# Patient Record
Sex: FEMALE | Race: WHITE | Employment: FULL TIME | URBAN - METROPOLITAN AREA
[De-identification: names, ages, dates, MRNs, and addresses within clinical notes are randomized per-mention and may not be internally consistent; named-entity substitution may affect disease eponyms.]

---

## 2017-02-20 RX ORDER — LISINOPRIL 20 MG/1
TABLET ORAL
Qty: 30 TABLET | Refills: 0 | Status: SHIPPED | OUTPATIENT
Start: 2017-02-20 | End: 2017-03-04

## 2017-02-20 NOTE — TELEPHONE ENCOUNTER
Spoke to patients spouse, left message with Jack Blackwood (on her hipaa) to call office back to set up BP check.

## 2017-03-04 ENCOUNTER — OFFICE VISIT (OUTPATIENT)
Dept: INTERNAL MEDICINE CLINIC | Facility: CLINIC | Age: 62
End: 2017-03-04

## 2017-03-04 VITALS
WEIGHT: 174 LBS | BODY MASS INDEX: 33 KG/M2 | DIASTOLIC BLOOD PRESSURE: 80 MMHG | TEMPERATURE: 98 F | HEART RATE: 56 BPM | SYSTOLIC BLOOD PRESSURE: 100 MMHG | RESPIRATION RATE: 16 BRPM

## 2017-03-04 DIAGNOSIS — I10 ESSENTIAL HYPERTENSION: Primary | ICD-10-CM

## 2017-03-04 DIAGNOSIS — R91.1 LUNG NODULE: ICD-10-CM

## 2017-03-04 DIAGNOSIS — K76.0 FATTY LIVER: ICD-10-CM

## 2017-03-04 DIAGNOSIS — E66.9 OBESITY (BMI 30-39.9): ICD-10-CM

## 2017-03-04 PROCEDURE — 99214 OFFICE O/P EST MOD 30 MIN: CPT | Performed by: INTERNAL MEDICINE

## 2017-03-04 RX ORDER — LISINOPRIL 20 MG/1
TABLET ORAL
Qty: 90 TABLET | Refills: 0 | Status: SHIPPED | OUTPATIENT
Start: 2017-03-04 | End: 2017-06-10

## 2017-03-04 RX ORDER — ATENOLOL 50 MG/1
50 TABLET ORAL DAILY
Qty: 90 TABLET | Refills: 0 | Status: SHIPPED | OUTPATIENT
Start: 2017-03-04 | End: 2017-06-10

## 2017-03-04 NOTE — PROGRESS NOTES
[facility]    CHIEF COMPLAINT:  Patient presents with:  Blood Pressure: check         HISTORY OF PRESENT ILLNESS:  Here for follow up. Htn: at goal. Taking meds regularly. No chest pain, no shortness of breath.      Has gained about 5lbs this wi TABLET (20 MG TOTAL) BY MOUTH DAILY. Dispense: 90 tablet; Refill: 0  - Lipid Panel [E]; Future  - Comp Metabolic Panel (14) [E]; Future    2. Obesity (BMI 30-39. 9)  Urged diet control  Urged to exercise regularly. 3. Lung nodule  Stable. Monitor.

## 2017-03-14 ENCOUNTER — APPOINTMENT (OUTPATIENT)
Dept: LAB | Age: 62
End: 2017-03-14
Attending: INTERNAL MEDICINE
Payer: COMMERCIAL

## 2017-03-14 DIAGNOSIS — I10 ESSENTIAL HYPERTENSION: ICD-10-CM

## 2017-03-14 LAB
ALBUMIN SERPL-MCNC: 3.6 G/DL (ref 3.5–4.8)
ALP LIVER SERPL-CCNC: 101 U/L (ref 50–130)
ALT SERPL-CCNC: 50 U/L (ref 14–54)
AST SERPL-CCNC: 29 U/L (ref 15–41)
BILIRUB SERPL-MCNC: 0.7 MG/DL (ref 0.1–2)
BUN BLD-MCNC: 21 MG/DL (ref 8–20)
CALCIUM BLD-MCNC: 9 MG/DL (ref 8.3–10.3)
CHLORIDE: 106 MMOL/L (ref 101–111)
CHOLEST SMN-MCNC: 142 MG/DL (ref ?–200)
CO2: 31 MMOL/L (ref 22–32)
CREAT BLD-MCNC: 0.91 MG/DL (ref 0.55–1.02)
GLUCOSE BLD-MCNC: 71 MG/DL (ref 70–99)
HDLC SERPL-MCNC: 38 MG/DL (ref 45–?)
HDLC SERPL: 3.74 {RATIO} (ref ?–4.44)
LDLC SERPL CALC-MCNC: 81 MG/DL (ref ?–130)
M PROTEIN MFR SERPL ELPH: 7.6 G/DL (ref 6.1–8.3)
NONHDLC SERPL-MCNC: 104 MG/DL (ref ?–130)
POTASSIUM SERPL-SCNC: 4.1 MMOL/L (ref 3.6–5.1)
SODIUM SERPL-SCNC: 143 MMOL/L (ref 136–144)
TRIGLYCERIDES: 113 MG/DL (ref ?–150)
VLDL: 23 MG/DL (ref 5–40)

## 2017-03-14 PROCEDURE — 80061 LIPID PANEL: CPT

## 2017-03-14 PROCEDURE — 80053 COMPREHEN METABOLIC PANEL: CPT

## 2017-05-05 ENCOUNTER — TELEPHONE (OUTPATIENT)
Dept: INTERNAL MEDICINE CLINIC | Facility: CLINIC | Age: 62
End: 2017-05-05

## 2017-05-05 DIAGNOSIS — R74.8 ELEVATED ALKALINE PHOSPHATASE LEVEL: ICD-10-CM

## 2017-05-05 DIAGNOSIS — E55.9 VITAMIN D DEFICIENCY: ICD-10-CM

## 2017-05-05 DIAGNOSIS — K76.0 FATTY LIVER: ICD-10-CM

## 2017-05-05 DIAGNOSIS — Z00.00 ROUTINE GENERAL MEDICAL EXAMINATION AT A HEALTH CARE FACILITY: Primary | ICD-10-CM

## 2017-05-05 DIAGNOSIS — I10 ESSENTIAL HYPERTENSION: ICD-10-CM

## 2017-05-05 NOTE — TELEPHONE ENCOUNTER
Pt asking for lab orders. Physical scheduled 10/14/17. Pt had Lipid and CMP 3/14/17. Pt is also scheduled for med check 6/10/17. Please advise.

## 2017-05-05 NOTE — TELEPHONE ENCOUNTER
Patient would like orders entered for labs associated with her annual physical.  She is scheduled to come in 10/14/17.

## 2017-05-08 NOTE — TELEPHONE ENCOUNTER
Please order cbc, cmp, lipid and tsh to do before her physical in October. No labs needed for Davina visit at this time. Will order if required at visit.

## 2017-05-09 NOTE — TELEPHONE ENCOUNTER
Labs ordered. History of Vit D deficiency. Do you want to re-check at this time? Detailed msg left on pt cell that labs ordered for October physical, fast 10-12 hrs, water okay.

## 2017-06-10 ENCOUNTER — OFFICE VISIT (OUTPATIENT)
Dept: INTERNAL MEDICINE CLINIC | Facility: CLINIC | Age: 62
End: 2017-06-10

## 2017-06-10 VITALS
HEART RATE: 53 BPM | RESPIRATION RATE: 12 BRPM | OXYGEN SATURATION: 99 % | SYSTOLIC BLOOD PRESSURE: 130 MMHG | TEMPERATURE: 98 F | DIASTOLIC BLOOD PRESSURE: 80 MMHG | HEIGHT: 61 IN | BODY MASS INDEX: 33.42 KG/M2 | WEIGHT: 177 LBS

## 2017-06-10 DIAGNOSIS — I10 ESSENTIAL HYPERTENSION: Primary | ICD-10-CM

## 2017-06-10 DIAGNOSIS — R63.5 WEIGHT GAIN: ICD-10-CM

## 2017-06-10 PROCEDURE — 99213 OFFICE O/P EST LOW 20 MIN: CPT | Performed by: INTERNAL MEDICINE

## 2017-06-10 RX ORDER — ATENOLOL 50 MG/1
50 TABLET ORAL DAILY
Qty: 90 TABLET | Refills: 1 | Status: SHIPPED | OUTPATIENT
Start: 2017-06-10 | End: 2017-10-14

## 2017-06-10 RX ORDER — LISINOPRIL 20 MG/1
TABLET ORAL
Qty: 90 TABLET | Refills: 1 | Status: SHIPPED | OUTPATIENT
Start: 2017-06-10 | End: 2017-10-14

## 2017-06-10 NOTE — PROGRESS NOTES
[facility]    CHIEF COMPLAINT:  Patient presents with:  Blood Pressure        HISTORY OF PRESENT ILLNESS:  Here for follow up. Htn: at goal. Taking meds regularly. No chest pain, no shortness of breath.      Weight gain: has gained about 3 lbs s PLAN:  1. Essential hypertension  At goal  - atenolol 50 MG Oral Tab; Take 1 tablet (50 mg total) by mouth daily. Dispense: 90 tablet; Refill: 1  - lisinopril 20 MG Oral Tab; TAKE 1 TABLET (20 MG TOTAL) BY MOUTH DAILY. Dispense: 90 tablet;  Refill: 1    2

## 2017-07-12 ENCOUNTER — OFFICE VISIT (OUTPATIENT)
Dept: INTERNAL MEDICINE CLINIC | Facility: CLINIC | Age: 62
End: 2017-07-12

## 2017-07-12 VITALS
HEART RATE: 51 BPM | HEIGHT: 61 IN | RESPIRATION RATE: 16 BRPM | BODY MASS INDEX: 33.64 KG/M2 | TEMPERATURE: 98 F | OXYGEN SATURATION: 95 % | SYSTOLIC BLOOD PRESSURE: 138 MMHG | WEIGHT: 178.19 LBS | DIASTOLIC BLOOD PRESSURE: 78 MMHG

## 2017-07-12 DIAGNOSIS — R03.0 TRANSIENT ELEVATED BLOOD PRESSURE: ICD-10-CM

## 2017-07-12 DIAGNOSIS — R51.9 NONINTRACTABLE HEADACHE, UNSPECIFIED CHRONICITY PATTERN, UNSPECIFIED HEADACHE TYPE: ICD-10-CM

## 2017-07-12 DIAGNOSIS — R53.83 FATIGUE, UNSPECIFIED TYPE: Primary | ICD-10-CM

## 2017-07-12 DIAGNOSIS — R63.0 DECREASED APPETITE: ICD-10-CM

## 2017-07-12 PROCEDURE — 99214 OFFICE O/P EST MOD 30 MIN: CPT | Performed by: PHYSICIAN ASSISTANT

## 2017-07-12 NOTE — PROGRESS NOTES
Tye Leon is a 57year old female  Patient presents with:  Blood Pressure  Headache      HPI:   Pt here to discuss htn  - she has noticed that about 1-2 weeks ago her blood pressure creeping up; around 160s/100  - she has been waking up in the mid shortness of breath with exertion,or cough   CARDIOVASCULAR: denies chest pain on exertion  GI: denies abdominal pain and denies heartburn, or change in bms  NEURO: as noted above    EXAM:   /78   Pulse 51   Temp 98.2 °F (36.8 °C) (Oral)   Resp 16

## 2017-07-13 ENCOUNTER — LABORATORY ENCOUNTER (OUTPATIENT)
Dept: LAB | Age: 62
End: 2017-07-13
Attending: INTERNAL MEDICINE
Payer: COMMERCIAL

## 2017-07-13 DIAGNOSIS — E55.9 VITAMIN D DEFICIENCY: ICD-10-CM

## 2017-07-13 DIAGNOSIS — I10 ESSENTIAL HYPERTENSION: ICD-10-CM

## 2017-07-13 DIAGNOSIS — K76.0 FATTY LIVER: ICD-10-CM

## 2017-07-13 DIAGNOSIS — Z00.00 ROUTINE GENERAL MEDICAL EXAMINATION AT A HEALTH CARE FACILITY: ICD-10-CM

## 2017-07-13 DIAGNOSIS — R74.8 ELEVATED ALKALINE PHOSPHATASE LEVEL: ICD-10-CM

## 2017-07-13 LAB
25-HYDROXYVITAMIN D (TOTAL): 19.5 NG/ML (ref 30–100)
ALBUMIN SERPL-MCNC: 3.9 G/DL (ref 3.5–4.8)
ALP LIVER SERPL-CCNC: 101 U/L (ref 50–130)
ALT SERPL-CCNC: 64 U/L (ref 14–54)
AST SERPL-CCNC: 40 U/L (ref 15–41)
BASOPHILS # BLD AUTO: 0.02 X10(3) UL (ref 0–0.1)
BASOPHILS NFR BLD AUTO: 0.5 %
BILIRUB SERPL-MCNC: 1 MG/DL (ref 0.1–2)
BUN BLD-MCNC: 17 MG/DL (ref 8–20)
CALCIUM BLD-MCNC: 8.8 MG/DL (ref 8.3–10.3)
CHLORIDE: 105 MMOL/L (ref 101–111)
CHOLEST SMN-MCNC: 142 MG/DL (ref ?–200)
CO2: 27 MMOL/L (ref 22–32)
CREAT BLD-MCNC: 0.89 MG/DL (ref 0.55–1.02)
EOSINOPHIL # BLD AUTO: 0.08 X10(3) UL (ref 0–0.3)
EOSINOPHIL NFR BLD AUTO: 2.1 %
ERYTHROCYTE [DISTWIDTH] IN BLOOD BY AUTOMATED COUNT: 12.3 % (ref 11.5–16)
GLUCOSE BLD-MCNC: 81 MG/DL (ref 70–99)
HCT VFR BLD AUTO: 45.9 % (ref 34–50)
HDLC SERPL-MCNC: 42 MG/DL (ref 45–?)
HDLC SERPL: 3.38 {RATIO} (ref ?–4.44)
HGB BLD-MCNC: 14.7 G/DL (ref 12–16)
IMMATURE GRANULOCYTE COUNT: 0.01 X10(3) UL (ref 0–1)
IMMATURE GRANULOCYTE RATIO %: 0.3 %
LDLC SERPL CALC-MCNC: 78 MG/DL (ref ?–130)
LYMPHOCYTES # BLD AUTO: 0.92 X10(3) UL (ref 0.9–4)
LYMPHOCYTES NFR BLD AUTO: 23.6 %
M PROTEIN MFR SERPL ELPH: 7.6 G/DL (ref 6.1–8.3)
MCH RBC QN AUTO: 31.1 PG (ref 27–33.2)
MCHC RBC AUTO-ENTMCNC: 32 G/DL (ref 31–37)
MCV RBC AUTO: 97 FL (ref 81–100)
MONOCYTES # BLD AUTO: 0.44 X10(3) UL (ref 0.1–0.6)
MONOCYTES NFR BLD AUTO: 11.3 %
NEUTROPHIL ABS PRELIM: 2.43 X10 (3) UL (ref 1.3–6.7)
NEUTROPHILS # BLD AUTO: 2.43 X10(3) UL (ref 1.3–6.7)
NEUTROPHILS NFR BLD AUTO: 62.2 %
NONHDLC SERPL-MCNC: 100 MG/DL (ref ?–130)
PLATELET # BLD AUTO: 161 10(3)UL (ref 150–450)
POTASSIUM SERPL-SCNC: 3.9 MMOL/L (ref 3.6–5.1)
RBC # BLD AUTO: 4.73 X10(6)UL (ref 3.8–5.1)
RED CELL DISTRIBUTION WIDTH-SD: 43.2 FL (ref 35.1–46.3)
SODIUM SERPL-SCNC: 141 MMOL/L (ref 136–144)
TRIGLYCERIDES: 108 MG/DL (ref ?–150)
TSI SER-ACNC: 1.75 MIU/ML (ref 0.35–5.5)
VLDL: 22 MG/DL (ref 5–40)
WBC # BLD AUTO: 3.9 X10(3) UL (ref 4–13)

## 2017-07-13 PROCEDURE — 36415 COLL VENOUS BLD VENIPUNCTURE: CPT

## 2017-07-13 PROCEDURE — 85025 COMPLETE CBC W/AUTO DIFF WBC: CPT

## 2017-07-13 PROCEDURE — 84443 ASSAY THYROID STIM HORMONE: CPT

## 2017-07-13 PROCEDURE — 80053 COMPREHEN METABOLIC PANEL: CPT

## 2017-07-13 PROCEDURE — 80061 LIPID PANEL: CPT

## 2017-07-13 PROCEDURE — 82306 VITAMIN D 25 HYDROXY: CPT

## 2017-07-14 DIAGNOSIS — R74.8 ELEVATED LIVER ENZYMES: Primary | ICD-10-CM

## 2017-07-14 DIAGNOSIS — I10 ESSENTIAL HYPERTENSION: ICD-10-CM

## 2017-07-14 DIAGNOSIS — R79.89 LOW VITAMIN D LEVEL: Primary | ICD-10-CM

## 2017-07-14 RX ORDER — ERGOCALCIFEROL 1.25 MG/1
50000 CAPSULE ORAL WEEKLY
Qty: 12 CAPSULE | Refills: 0 | Status: SHIPPED | OUTPATIENT
Start: 2017-07-14 | End: 2017-10-06

## 2017-09-08 RX ORDER — METOPROLOL SUCCINATE 50 MG/1
50 TABLET, EXTENDED RELEASE ORAL DAILY
Qty: 90 TABLET | Refills: 0 | Status: SHIPPED | OUTPATIENT
Start: 2017-09-08 | End: 2017-10-14

## 2017-09-08 NOTE — TELEPHONE ENCOUNTER
Refill done for metoprolol. She was originally on atenolol but this is back ordered. If elevated bp or if any lightheadedness or dizziness pt should call us. Please inform pt about this change.

## 2017-09-08 NOTE — TELEPHONE ENCOUNTER
Patient was previously on Atenolol 50 mg     Last OV pertinent to medication: 7/12/17 acute      6/10/17 BP check   Last refill date: 6/10/17     #/refills: 90 tabs + 1 refill   When pt was asked to return for OV: October for physical   Upcoming appt/reaso

## 2017-09-29 ENCOUNTER — PATIENT MESSAGE (OUTPATIENT)
Dept: INTERNAL MEDICINE CLINIC | Facility: CLINIC | Age: 62
End: 2017-09-29

## 2017-09-29 NOTE — TELEPHONE ENCOUNTER
From: Janice Waters Tommy  To: Kira Cifuentes MD  Sent: 9/29/2017 6:34 AM CDT  Subject: Non-Urgent Medical Question    Having a physical on 10/14, already had some blood work done-Do I need to go in for more? Not sure if there is an order on file.  Blood pres

## 2017-09-29 NOTE — TELEPHONE ENCOUNTER
Pt has PE on 10/14/17. Overdue for CMP lab (was due approx 8/14/17), Vitamin D is due to recheck at time of PE (approx 10/14/17).  Since pt has labs anyway, asking if any addl should be ordered for appt - or would you prefer for her to just wait on all labs

## 2017-09-29 NOTE — TELEPHONE ENCOUNTER
See lab orders. There is an order in for cmp and vit d to be done. She should do these prior to her visit.

## 2017-10-03 ENCOUNTER — APPOINTMENT (OUTPATIENT)
Dept: LAB | Age: 62
End: 2017-10-03
Attending: PHYSICIAN ASSISTANT
Payer: COMMERCIAL

## 2017-10-03 DIAGNOSIS — R74.8 ELEVATED LIVER ENZYMES: ICD-10-CM

## 2017-10-03 DIAGNOSIS — R79.89 LOW VITAMIN D LEVEL: ICD-10-CM

## 2017-10-03 DIAGNOSIS — I10 ESSENTIAL HYPERTENSION: ICD-10-CM

## 2017-10-03 PROCEDURE — 80053 COMPREHEN METABOLIC PANEL: CPT

## 2017-10-03 PROCEDURE — 36415 COLL VENOUS BLD VENIPUNCTURE: CPT

## 2017-10-03 PROCEDURE — 82306 VITAMIN D 25 HYDROXY: CPT

## 2017-10-14 ENCOUNTER — OFFICE VISIT (OUTPATIENT)
Dept: INTERNAL MEDICINE CLINIC | Facility: CLINIC | Age: 62
End: 2017-10-14

## 2017-10-14 VITALS
TEMPERATURE: 98 F | OXYGEN SATURATION: 98 % | WEIGHT: 175 LBS | HEART RATE: 49 BPM | HEIGHT: 61.5 IN | SYSTOLIC BLOOD PRESSURE: 136 MMHG | BODY MASS INDEX: 32.62 KG/M2 | RESPIRATION RATE: 16 BRPM | DIASTOLIC BLOOD PRESSURE: 86 MMHG

## 2017-10-14 DIAGNOSIS — K76.0 FATTY LIVER: ICD-10-CM

## 2017-10-14 DIAGNOSIS — I10 ESSENTIAL HYPERTENSION: ICD-10-CM

## 2017-10-14 DIAGNOSIS — Z12.31 ENCOUNTER FOR SCREENING MAMMOGRAM FOR BREAST CANCER: ICD-10-CM

## 2017-10-14 DIAGNOSIS — G89.29 CHRONIC PAIN OF LEFT KNEE: ICD-10-CM

## 2017-10-14 DIAGNOSIS — M25.562 CHRONIC PAIN OF LEFT KNEE: ICD-10-CM

## 2017-10-14 DIAGNOSIS — Z12.11 SCREENING FOR COLON CANCER: ICD-10-CM

## 2017-10-14 DIAGNOSIS — Z00.00 PHYSICAL EXAM, ANNUAL: Primary | ICD-10-CM

## 2017-10-14 PROCEDURE — 99396 PREV VISIT EST AGE 40-64: CPT | Performed by: INTERNAL MEDICINE

## 2017-10-14 RX ORDER — METOPROLOL SUCCINATE 50 MG/1
50 TABLET, EXTENDED RELEASE ORAL DAILY
Qty: 90 TABLET | Refills: 0 | Status: SHIPPED | OUTPATIENT
Start: 2017-10-14 | End: 2018-01-15

## 2017-10-14 RX ORDER — LISINOPRIL 20 MG/1
TABLET ORAL
Qty: 90 TABLET | Refills: 0 | Status: SHIPPED | OUTPATIENT
Start: 2017-10-14 | End: 2018-01-15

## 2017-10-14 NOTE — PROGRESS NOTES
[facility]    CHIEF COMPLAINT: Patient presents with:  Physical: pap test due. Brought BP readings. Immunization record updated.         HPI:   Yinka Lord is a 60year old female who presents for a complete physical exam. Symptoms: denies with cholelithiasis 3/27/2014   • Dysuria    • Elevated alkaline phosphatase level 12/9/2013   • HTN (hypertension)    • Lung nodule 12/9/2013   • Obesity    • Strabismus    • Unspecified essential hypertension 6/29/2012   • Vitamin D deficiency 12/9/2013 chest tenderness  LUNGS: clear to auscultation  CARDIO: nl s1 and s2, RRR without murmur  GI: good BS's,no masses, HSM or tenderness  BREAST: no dominant or suspicious mass, no nipple discharge  GENITAL/URINARY:  External Genitalia:  General appearance; no a complete physical exam.   1. Physical exam, annual  Pelvic done. mammo ordered as screening, she still has order for additional views in the system. She will check with mammo dept if she needs anything else   Colonoscopy never done. Declines.  Given hem

## 2017-10-22 ENCOUNTER — LAB ENCOUNTER (OUTPATIENT)
Dept: LAB | Facility: HOSPITAL | Age: 62
End: 2017-10-22
Attending: INTERNAL MEDICINE
Payer: COMMERCIAL

## 2017-10-22 DIAGNOSIS — Z12.11 SCREENING FOR COLON CANCER: Primary | ICD-10-CM

## 2017-10-22 PROCEDURE — 82272 OCCULT BLD FECES 1-3 TESTS: CPT

## 2017-12-28 ENCOUNTER — HOSPITAL ENCOUNTER (OUTPATIENT)
Age: 62
Discharge: HOME | End: 2017-12-28
Attending: FAMILY MEDICINE
Payer: COMMERCIAL

## 2017-12-28 VITALS
HEART RATE: 74 BPM | WEIGHT: 175.06 LBS | OXYGEN SATURATION: 98 % | TEMPERATURE: 98 F | DIASTOLIC BLOOD PRESSURE: 100 MMHG | RESPIRATION RATE: 16 BRPM | BODY MASS INDEX: 33 KG/M2 | SYSTOLIC BLOOD PRESSURE: 160 MMHG

## 2017-12-28 DIAGNOSIS — J04.0 ACUTE LARYNGITIS: Primary | ICD-10-CM

## 2017-12-28 PROCEDURE — 87081 CULTURE SCREEN ONLY: CPT | Performed by: FAMILY MEDICINE

## 2017-12-28 PROCEDURE — 99204 OFFICE O/P NEW MOD 45 MIN: CPT

## 2017-12-28 PROCEDURE — 99214 OFFICE O/P EST MOD 30 MIN: CPT

## 2017-12-28 PROCEDURE — 87430 STREP A AG IA: CPT | Performed by: FAMILY MEDICINE

## 2017-12-28 RX ORDER — FLUTICASONE PROPIONATE 50 MCG
2 SPRAY, SUSPENSION (ML) NASAL DAILY
Qty: 1 BOTTLE | Refills: 1 | Status: SHIPPED | OUTPATIENT
Start: 2017-12-28 | End: 2018-01-15

## 2017-12-28 RX ORDER — BENZONATATE 200 MG/1
200 CAPSULE ORAL EVERY 8 HOURS PRN
Qty: 30 CAPSULE | Refills: 0 | Status: SHIPPED | OUTPATIENT
Start: 2017-12-28 | End: 2018-01-15

## 2017-12-29 NOTE — ED PROVIDER NOTES
Patient Seen in: [address] [facility]    History   Patient presents with:  Sore Throat    Stated Complaint: sore throat, cough, x1 week    Nasal congestion x 1 week.   Was getting increased sinus pressure, but its improving now.  + fa appears well-developed and well-nourished. No distress. HENT:   Right Ear: External ear normal.   Left Ear: External ear normal.   Mouth/Throat: Oropharynx is clear and moist. No oropharyngeal exudate.    Eyes: Conjunctivae are normal. Right eye exhibits laryngitis

## 2018-01-15 ENCOUNTER — OFFICE VISIT (OUTPATIENT)
Dept: INTERNAL MEDICINE CLINIC | Facility: CLINIC | Age: 63
End: 2018-01-15

## 2018-01-15 VITALS
DIASTOLIC BLOOD PRESSURE: 96 MMHG | HEART RATE: 64 BPM | RESPIRATION RATE: 12 BRPM | TEMPERATURE: 99 F | HEIGHT: 61.5 IN | BODY MASS INDEX: 32.68 KG/M2 | SYSTOLIC BLOOD PRESSURE: 160 MMHG | WEIGHT: 175.31 LBS

## 2018-01-15 DIAGNOSIS — K76.0 FATTY LIVER: ICD-10-CM

## 2018-01-15 DIAGNOSIS — I77.9 BILATERAL CAROTID ARTERY DISEASE (HCC): ICD-10-CM

## 2018-01-15 DIAGNOSIS — I10 ESSENTIAL HYPERTENSION: ICD-10-CM

## 2018-01-15 PROCEDURE — 99214 OFFICE O/P EST MOD 30 MIN: CPT | Performed by: INTERNAL MEDICINE

## 2018-01-15 RX ORDER — METOPROLOL SUCCINATE 50 MG/1
50 TABLET, EXTENDED RELEASE ORAL DAILY
Qty: 90 TABLET | Refills: 0 | Status: SHIPPED | OUTPATIENT
Start: 2018-01-15 | End: 2018-02-05

## 2018-01-15 RX ORDER — LISINOPRIL 40 MG/1
40 TABLET ORAL DAILY
Qty: 90 TABLET | Refills: 0 | Status: SHIPPED | OUTPATIENT
Start: 2018-01-15 | End: 2018-02-05

## 2018-01-15 NOTE — PROGRESS NOTES
[address] [facility]    CHIEF COMPLAINT:  Patient presents with:  Blood Pressure: has not had a colonoscopy but has done stool cards. HISTORY OF PRESENT ILLNESS:  Here for follow up. Htn: elevated today. Has been elevated at home as well.  Takes h lisinopril 40 MG Oral Tab; Take 1 tablet (40 mg total) by mouth daily. TAKE 1 TABLET (20 MG TOTAL) BY MOUTH DAILY. Dispense: 90 tablet; Refill: 0  - Metoprolol Succinate ER 50 MG Oral Tablet 24 Hr; Take 1 tablet (50 mg total) by mouth daily.   Dispense: 90

## 2018-01-30 ENCOUNTER — APPOINTMENT (OUTPATIENT)
Dept: LAB | Age: 63
End: 2018-01-30
Attending: INTERNAL MEDICINE
Payer: COMMERCIAL

## 2018-01-30 DIAGNOSIS — I10 ESSENTIAL HYPERTENSION: ICD-10-CM

## 2018-01-30 LAB
ALBUMIN SERPL-MCNC: 3.9 G/DL (ref 3.5–4.8)
ALP LIVER SERPL-CCNC: 105 U/L (ref 50–130)
ALT SERPL-CCNC: 96 U/L (ref 14–54)
AST SERPL-CCNC: 63 U/L (ref 15–41)
BILIRUB SERPL-MCNC: 0.8 MG/DL (ref 0.1–2)
BUN BLD-MCNC: 18 MG/DL (ref 8–20)
CALCIUM BLD-MCNC: 8.8 MG/DL (ref 8.3–10.3)
CHLORIDE: 106 MMOL/L (ref 101–111)
CO2: 30 MMOL/L (ref 22–32)
CREAT BLD-MCNC: 0.77 MG/DL (ref 0.55–1.02)
GLUCOSE BLD-MCNC: 86 MG/DL (ref 70–99)
M PROTEIN MFR SERPL ELPH: 7.7 G/DL (ref 6.1–8.3)
POTASSIUM SERPL-SCNC: 4.3 MMOL/L (ref 3.6–5.1)
SODIUM SERPL-SCNC: 142 MMOL/L (ref 136–144)

## 2018-01-30 PROCEDURE — 36415 COLL VENOUS BLD VENIPUNCTURE: CPT

## 2018-01-30 PROCEDURE — 80053 COMPREHEN METABOLIC PANEL: CPT

## 2018-01-31 DIAGNOSIS — R79.89 ELEVATED LFTS: Primary | ICD-10-CM

## 2018-02-03 ENCOUNTER — HOSPITAL ENCOUNTER (OUTPATIENT)
Dept: ULTRASOUND IMAGING | Age: 63
Discharge: HOME | End: 2018-02-03
Attending: INTERNAL MEDICINE
Payer: COMMERCIAL

## 2018-02-03 DIAGNOSIS — R79.89 ELEVATED LFTS: ICD-10-CM

## 2018-02-03 PROCEDURE — 76700 US EXAM ABDOM COMPLETE: CPT | Performed by: INTERNAL MEDICINE

## 2018-02-05 ENCOUNTER — OFFICE VISIT (OUTPATIENT)
Dept: INTERNAL MEDICINE CLINIC | Facility: CLINIC | Age: 63
End: 2018-02-05

## 2018-02-05 VITALS
DIASTOLIC BLOOD PRESSURE: 96 MMHG | SYSTOLIC BLOOD PRESSURE: 164 MMHG | WEIGHT: 173.5 LBS | TEMPERATURE: 99 F | HEIGHT: 61.5 IN | BODY MASS INDEX: 32.34 KG/M2 | RESPIRATION RATE: 12 BRPM | HEART RATE: 76 BPM

## 2018-02-05 DIAGNOSIS — K76.0 FATTY LIVER: ICD-10-CM

## 2018-02-05 DIAGNOSIS — I10 ESSENTIAL HYPERTENSION: ICD-10-CM

## 2018-02-05 DIAGNOSIS — J06.9 UPPER RESPIRATORY TRACT INFECTION, UNSPECIFIED TYPE: ICD-10-CM

## 2018-02-05 PROCEDURE — 99214 OFFICE O/P EST MOD 30 MIN: CPT | Performed by: INTERNAL MEDICINE

## 2018-02-05 RX ORDER — LISINOPRIL 40 MG/1
40 TABLET ORAL DAILY
Qty: 90 TABLET | Refills: 0 | COMMUNITY
Start: 2018-02-05 | End: 2018-04-16

## 2018-02-05 RX ORDER — METOPROLOL SUCCINATE 50 MG/1
100 TABLET, EXTENDED RELEASE ORAL DAILY
Qty: 90 TABLET | Refills: 0 | COMMUNITY
Start: 2018-02-05 | End: 2018-02-20

## 2018-02-05 RX ORDER — AZITHROMYCIN 250 MG/1
TABLET, FILM COATED ORAL
Qty: 6 TABLET | Refills: 0 | Status: SHIPPED | OUTPATIENT
Start: 2018-02-05 | End: 2018-02-20 | Stop reason: ALTCHOICE

## 2018-02-05 RX ORDER — BENZONATATE 200 MG/1
200 CAPSULE ORAL 3 TIMES DAILY PRN
Qty: 30 CAPSULE | Refills: 0 | Status: SHIPPED | OUTPATIENT
Start: 2018-02-05 | End: 2018-02-20 | Stop reason: ALTCHOICE

## 2018-02-05 NOTE — PROGRESS NOTES
[facility]    CHIEF COMPLAINT:  Patient presents with:  Blood Pressure: has not had colonoscopy. Did stool cards last year  Sinus Problem: sx returned yesterday.   Cough: took a Tessalon Peale last pm        HISTORY OF PRESENT ILLNESS:  Here for mg/dL   BUN 18 8 - 20 mg/dL   Creatinine 0.77 0.55 - 1.02 mg/dL   GFR 82 >=60   Calcium, Total 8.8 8.3 - 10.3 mg/dL   Alkaline Phosphatase 105 50 - 130 U/L   AST 63 (H) 15 - 41 U/L   Alt 96 (H) 14 - 54 U/L   Bilirubin, Total 0.8 0.1 - 2.0 mg/dL   Total Pro

## 2018-02-20 ENCOUNTER — OFFICE VISIT (OUTPATIENT)
Dept: INTERNAL MEDICINE CLINIC | Facility: CLINIC | Age: 63
End: 2018-02-20

## 2018-02-20 VITALS
SYSTOLIC BLOOD PRESSURE: 110 MMHG | HEIGHT: 61.5 IN | DIASTOLIC BLOOD PRESSURE: 70 MMHG | BODY MASS INDEX: 32.43 KG/M2 | TEMPERATURE: 98 F | WEIGHT: 174 LBS | HEART RATE: 68 BPM

## 2018-02-20 DIAGNOSIS — R74.8 ELEVATED LIVER ENZYMES: ICD-10-CM

## 2018-02-20 DIAGNOSIS — I10 ESSENTIAL HYPERTENSION: ICD-10-CM

## 2018-02-20 PROCEDURE — 99213 OFFICE O/P EST LOW 20 MIN: CPT | Performed by: INTERNAL MEDICINE

## 2018-02-20 RX ORDER — METOPROLOL SUCCINATE 100 MG/1
100 TABLET, EXTENDED RELEASE ORAL DAILY
Qty: 90 TABLET | Refills: 0 | Status: SHIPPED | OUTPATIENT
Start: 2018-02-20 | End: 2018-04-16

## 2018-02-20 NOTE — PROGRESS NOTES
[facility]    CHIEF COMPLAINT:  Patient presents with:  Blood Pressure        HISTORY OF PRESENT ILLNESS:  Here for follow up. Htn: at goal now. Taking metoprolol succinate 100mg daily and lisinopril 40mg daily. Tolerating well.  No chest pain, Metoprolol Succinate  MG Oral Tablet 24 Hr; Take 1 tablet (100 mg total) by mouth daily. Dispense: 90 tablet; Refill: 0    2. Elevated liver enzymes  Recheck in 3 months. Low fat low carb diet. Weight loss. - COMP METABOLIC PANEL (14);  Future

## 2018-04-13 DIAGNOSIS — I10 ESSENTIAL HYPERTENSION: ICD-10-CM

## 2018-04-14 ENCOUNTER — HOSPITAL ENCOUNTER (OUTPATIENT)
Dept: MAMMOGRAPHY | Facility: HOSPITAL | Age: 63
Discharge: HOME | End: 2018-04-14
Attending: INTERNAL MEDICINE
Payer: COMMERCIAL

## 2018-04-14 DIAGNOSIS — Z12.31 ENCOUNTER FOR SCREENING MAMMOGRAM FOR BREAST CANCER: ICD-10-CM

## 2018-04-14 PROCEDURE — 77067 SCR MAMMO BI INCL CAD: CPT | Performed by: INTERNAL MEDICINE

## 2018-04-14 PROCEDURE — 77063 BREAST TOMOSYNTHESIS BI: CPT | Performed by: INTERNAL MEDICINE

## 2018-04-16 DIAGNOSIS — I10 ESSENTIAL HYPERTENSION: ICD-10-CM

## 2018-04-16 RX ORDER — LISINOPRIL 40 MG/1
TABLET ORAL
Qty: 90 TABLET | Refills: 0 | Status: SHIPPED | OUTPATIENT
Start: 2018-04-16 | End: 2018-07-10

## 2018-04-16 RX ORDER — METOPROLOL SUCCINATE 100 MG/1
100 TABLET, EXTENDED RELEASE ORAL DAILY
Qty: 90 TABLET | Refills: 0 | Status: SHIPPED | OUTPATIENT
Start: 2018-04-16 | End: 2018-09-01

## 2018-04-16 RX ORDER — METOPROLOL SUCCINATE 100 MG/1
TABLET, EXTENDED RELEASE ORAL
Qty: 90 TABLET | Refills: 0 | OUTPATIENT
Start: 2018-04-16

## 2018-04-16 RX ORDER — METOPROLOL SUCCINATE 50 MG/1
TABLET, EXTENDED RELEASE ORAL
Qty: 90 TABLET | Refills: 0 | OUTPATIENT
Start: 2018-04-16

## 2018-04-16 NOTE — TELEPHONE ENCOUNTER
Refill done for lisinopril. Dose for metoprolol er is wrong. Pt is on 100mg daily. Please check with pharmacy.

## 2018-04-16 NOTE — TELEPHONE ENCOUNTER
Spoke with pharmacist.  Verified with her that last refill for Metoprolol Succinate ER was for 100mg vs 50mg. Refused the 50mg and ordered 100mg daily.

## 2018-05-14 ENCOUNTER — APPOINTMENT (OUTPATIENT)
Dept: LAB | Age: 63
End: 2018-05-14
Attending: INTERNAL MEDICINE
Payer: COMMERCIAL

## 2018-05-14 DIAGNOSIS — R74.8 ELEVATED LIVER ENZYMES: ICD-10-CM

## 2018-05-14 PROCEDURE — 36415 COLL VENOUS BLD VENIPUNCTURE: CPT

## 2018-05-14 PROCEDURE — 80053 COMPREHEN METABOLIC PANEL: CPT

## 2018-05-15 DIAGNOSIS — R74.8 ELEVATED LIVER ENZYMES: Primary | ICD-10-CM

## 2018-05-20 ENCOUNTER — PATIENT MESSAGE (OUTPATIENT)
Dept: INTERNAL MEDICINE CLINIC | Facility: CLINIC | Age: 63
End: 2018-05-20

## 2018-05-20 DIAGNOSIS — I10 ESSENTIAL HYPERTENSION: ICD-10-CM

## 2018-05-21 RX ORDER — METOPROLOL SUCCINATE 100 MG/1
100 TABLET, EXTENDED RELEASE ORAL DAILY
Qty: 90 TABLET | Refills: 0
Start: 2018-05-21

## 2018-05-21 NOTE — TELEPHONE ENCOUNTER
From: Verner Carpenter Davina Fermín Juarez  Sent: 5/20/2018 8:27 AM CDT  Subject: Medication Renewal Request    Diane Lancaster would like a refill of the following medications:     Metoprolol Succinate  MG Oral Tablet 24 Hr Juan Carlos Zacarias MD]    [facility]

## 2018-05-21 NOTE — TELEPHONE ENCOUNTER
From: Bradley Suh June Christei Peach  To: Ji Camargo MD  Sent: 5/20/2018 8:32 AM CDT  Subject: Referral Request    I have made an appointment with Dr. Ayaan Live (Hepitologist) as requested by Dr. Sherin Haro for June 18.  Dr Salma Romero office requested I obtain a referral f

## 2018-06-18 ENCOUNTER — LAB ENCOUNTER (OUTPATIENT)
Dept: LAB | Age: 63
End: 2018-06-18
Attending: INTERNAL MEDICINE
Payer: COMMERCIAL

## 2018-06-18 ENCOUNTER — OFFICE VISIT (OUTPATIENT)
Dept: SURGERY | Facility: CLINIC | Age: 63
End: 2018-06-18

## 2018-06-18 VITALS
RESPIRATION RATE: 18 BRPM | HEIGHT: 61.5 IN | OXYGEN SATURATION: 99 % | DIASTOLIC BLOOD PRESSURE: 97 MMHG | BODY MASS INDEX: 33.08 KG/M2 | SYSTOLIC BLOOD PRESSURE: 162 MMHG | WEIGHT: 177.5 LBS | HEART RATE: 59 BPM

## 2018-06-18 DIAGNOSIS — R79.89 ABNORMAL LIVER FUNCTION TESTS: ICD-10-CM

## 2018-06-18 DIAGNOSIS — R79.89 ABNORMAL LIVER FUNCTION TESTS: Primary | ICD-10-CM

## 2018-06-18 PROCEDURE — 80076 HEPATIC FUNCTION PANEL: CPT

## 2018-06-18 PROCEDURE — 86706 HEP B SURFACE ANTIBODY: CPT

## 2018-06-18 PROCEDURE — 86704 HEP B CORE ANTIBODY TOTAL: CPT

## 2018-06-18 PROCEDURE — 82104 ALPHA-1-ANTITRYPSIN PHENO: CPT

## 2018-06-18 PROCEDURE — 86708 HEPATITIS A ANTIBODY: CPT

## 2018-06-18 PROCEDURE — 87340 HEPATITIS B SURFACE AG IA: CPT

## 2018-06-18 PROCEDURE — 86038 ANTINUCLEAR ANTIBODIES: CPT

## 2018-06-18 PROCEDURE — 85025 COMPLETE CBC W/AUTO DIFF WBC: CPT

## 2018-06-18 PROCEDURE — 82103 ALPHA-1-ANTITRYPSIN TOTAL: CPT

## 2018-06-18 PROCEDURE — 83516 IMMUNOASSAY NONANTIBODY: CPT

## 2018-06-18 PROCEDURE — 86803 HEPATITIS C AB TEST: CPT

## 2018-06-18 PROCEDURE — 36415 COLL VENOUS BLD VENIPUNCTURE: CPT

## 2018-06-18 PROCEDURE — 82784 ASSAY IGA/IGD/IGG/IGM EACH: CPT

## 2018-06-18 PROCEDURE — 82728 ASSAY OF FERRITIN: CPT

## 2018-06-18 RX ORDER — SOY ISOFLAVONE 40 MG
1 TABLET ORAL DAILY
COMMUNITY
End: 2021-05-10

## 2018-06-18 NOTE — PROGRESS NOTES
[facility]  [address], [address]  [address]  [phone] ([phone]) Smokeless tobacco: Never Used                      Alcohol use:  No                   Current Outpatient Prescriptions:   •  Glucosamine-Chondroitin (GLUCOSAMINE CHONDR COMPLEX OR), Take 1 tablet b

## 2018-06-20 ENCOUNTER — TELEPHONE (OUTPATIENT)
Dept: SURGERY | Facility: CLINIC | Age: 63
End: 2018-06-20

## 2018-06-20 ENCOUNTER — TELEPHONE (OUTPATIENT)
Dept: INTERNAL MEDICINE CLINIC | Facility: CLINIC | Age: 63
End: 2018-06-20

## 2018-06-20 NOTE — TELEPHONE ENCOUNTER
Spoke with patient about questions about biopsy. She will schedule and we will arrange brief appointment right before biopsy so MD visit within 30 days.

## 2018-06-20 NOTE — TELEPHONE ENCOUNTER
Patient said that she got a call from a nurse named Alexandrea Erickson and she told the patient that she does not need a pre-op apt with us because she did this with Dr. Terrell Grandchild 6/18/18. Patient cancelled her apt with us for tomorrow, this is a FYI. Thank you.

## 2018-06-20 NOTE — TELEPHONE ENCOUNTER
Noted below. Pre op Clearance Request form faxed to Dr. Chun Vargas at [phone]. Fax confirmation received. Fax/form in Pre op pending bin. FYI forwarded to Alfie Gallardo.

## 2018-06-20 NOTE — TELEPHONE ENCOUNTER
Date Preop Scheduled:  6/21/18  Surgeon's Name:  Dr Alice Blood   Phone #:  unknown   Fax #:  unknown  Surgery Date:  6/28/18  Procedure/Diagnosis:  Liver biopsy

## 2018-06-28 ENCOUNTER — HOSPITAL ENCOUNTER (OUTPATIENT)
Dept: ULTRASOUND IMAGING | Facility: HOSPITAL | Age: 63
Discharge: HOME | End: 2018-06-28
Attending: INTERNAL MEDICINE
Payer: COMMERCIAL

## 2018-06-28 ENCOUNTER — APPOINTMENT (OUTPATIENT)
Dept: LAB | Facility: HOSPITAL | Age: 63
End: 2018-06-28
Attending: INTERNAL MEDICINE
Payer: COMMERCIAL

## 2018-06-28 VITALS
RESPIRATION RATE: 18 BRPM | SYSTOLIC BLOOD PRESSURE: 154 MMHG | HEIGHT: 62.5 IN | WEIGHT: 166 LBS | DIASTOLIC BLOOD PRESSURE: 91 MMHG | TEMPERATURE: 98 F | HEART RATE: 67 BPM | BODY MASS INDEX: 29.79 KG/M2 | OXYGEN SATURATION: 100 %

## 2018-06-28 DIAGNOSIS — R79.89 ABNORMAL LIVER FUNCTION TEST: ICD-10-CM

## 2018-06-28 DIAGNOSIS — R79.89 ABNORMAL LIVER FUNCTION TESTS: ICD-10-CM

## 2018-06-28 DIAGNOSIS — R79.89 ABNORMAL LIVER FUNCTION TEST: Primary | ICD-10-CM

## 2018-06-28 LAB
INR BLD: 1.1 (ref 0.9–1.1)
PSA SERPL DL<=0.01 NG/ML-MCNC: 14.7 SECONDS (ref 12.4–14.7)

## 2018-06-28 PROCEDURE — 36415 COLL VENOUS BLD VENIPUNCTURE: CPT

## 2018-06-28 PROCEDURE — 85610 PROTHROMBIN TIME: CPT

## 2018-06-28 PROCEDURE — 76942 ECHO GUIDE FOR BIOPSY: CPT | Performed by: INTERNAL MEDICINE

## 2018-06-28 PROCEDURE — 47000 NEEDLE BIOPSY OF LIVER PERQ: CPT | Performed by: INTERNAL MEDICINE

## 2018-06-28 PROCEDURE — 99152 MOD SED SAME PHYS/QHP 5/>YRS: CPT | Performed by: INTERNAL MEDICINE

## 2018-06-28 PROCEDURE — 88307 TISSUE EXAM BY PATHOLOGIST: CPT | Performed by: INTERNAL MEDICINE

## 2018-06-28 PROCEDURE — 88313 SPECIAL STAINS GROUP 2: CPT | Performed by: INTERNAL MEDICINE

## 2018-06-28 RX ORDER — NALOXONE HYDROCHLORIDE 0.4 MG/ML
80 INJECTION, SOLUTION INTRAMUSCULAR; INTRAVENOUS; SUBCUTANEOUS AS NEEDED
Status: DISCONTINUED | OUTPATIENT
Start: 2018-06-28 | End: 2018-06-30

## 2018-06-28 RX ORDER — FLUMAZENIL 0.1 MG/ML
INJECTION, SOLUTION INTRAVENOUS
Status: DISCONTINUED
Start: 2018-06-28 | End: 2018-06-28 | Stop reason: WASHOUT

## 2018-06-28 RX ORDER — MIDAZOLAM HYDROCHLORIDE 1 MG/ML
INJECTION INTRAMUSCULAR; INTRAVENOUS
Status: COMPLETED
Start: 2018-06-28 | End: 2018-06-28

## 2018-06-28 RX ORDER — MIDAZOLAM HYDROCHLORIDE 1 MG/ML
1 INJECTION INTRAMUSCULAR; INTRAVENOUS EVERY 5 MIN PRN
Status: ACTIVE | OUTPATIENT
Start: 2018-06-28 | End: 2018-06-28

## 2018-06-28 RX ORDER — HYDROCODONE BITARTRATE AND ACETAMINOPHEN 5; 325 MG/1; MG/1
1 TABLET ORAL EVERY 4 HOURS PRN
Status: DISCONTINUED | OUTPATIENT
Start: 2018-06-28 | End: 2018-06-30

## 2018-06-28 RX ORDER — NALOXONE HYDROCHLORIDE 0.4 MG/ML
INJECTION, SOLUTION INTRAMUSCULAR; INTRAVENOUS; SUBCUTANEOUS
Status: DISCONTINUED
Start: 2018-06-28 | End: 2018-06-28 | Stop reason: WASHOUT

## 2018-06-28 RX ORDER — FLUMAZENIL 0.1 MG/ML
0.2 INJECTION, SOLUTION INTRAVENOUS AS NEEDED
Status: DISCONTINUED | OUTPATIENT
Start: 2018-06-28 | End: 2018-06-30

## 2018-06-28 RX ORDER — HYDROCODONE BITARTRATE AND ACETAMINOPHEN 5; 325 MG/1; MG/1
2 TABLET ORAL EVERY 4 HOURS PRN
Status: DISCONTINUED | OUTPATIENT
Start: 2018-06-28 | End: 2018-06-30

## 2018-06-28 RX ORDER — SODIUM CHLORIDE 9 MG/ML
INJECTION, SOLUTION INTRAVENOUS CONTINUOUS
Status: DISCONTINUED | OUTPATIENT
Start: 2018-06-28 | End: 2018-06-30

## 2018-06-28 RX ORDER — ACETAMINOPHEN 325 MG/1
650 TABLET ORAL EVERY 4 HOURS PRN
Status: DISCONTINUED | OUTPATIENT
Start: 2018-06-28 | End: 2018-06-30

## 2018-06-28 RX ADMIN — MIDAZOLAM HYDROCHLORIDE 1 MG: 1 INJECTION INTRAMUSCULAR; INTRAVENOUS at 09:30:00

## 2018-06-28 RX ADMIN — MIDAZOLAM HYDROCHLORIDE 0.5 MG: 1 INJECTION INTRAMUSCULAR; INTRAVENOUS at 09:37:00

## 2018-06-28 RX ADMIN — SODIUM CHLORIDE: 9 INJECTION, SOLUTION INTRAVENOUS at 09:20:00

## 2018-06-28 NOTE — PROCEDURES
[facility]  Procedure Note    Nino Chavira Patient Status:  Outpatient    10/10/1955 MRN [id number]   [facility] ULTRASOUND Attending Bert Castelan MD   Hosp Day # 0 PCP Ryan Alex MD     Procedure: liver biopsy, non focal

## 2018-06-28 NOTE — IMAGING NOTE
Post liver bx. Tolerated procedure and sedation well. Reports abd ache since surgery to remove gall bladder a few hours ago. Reports same pain after, however no increase in pain. Alert and oriented upon departure to [address].  Report called to [facility] and bed as

## 2018-07-06 DIAGNOSIS — K75.81 NASH (NONALCOHOLIC STEATOHEPATITIS): Primary | ICD-10-CM

## 2018-07-10 ENCOUNTER — PATIENT MESSAGE (OUTPATIENT)
Dept: INTERNAL MEDICINE CLINIC | Facility: CLINIC | Age: 63
End: 2018-07-10

## 2018-07-10 DIAGNOSIS — I10 ESSENTIAL HYPERTENSION: ICD-10-CM

## 2018-07-10 RX ORDER — LISINOPRIL 40 MG/1
TABLET ORAL
Qty: 30 TABLET | Refills: 0 | Status: SHIPPED | OUTPATIENT
Start: 2018-07-10 | End: 2018-09-01

## 2018-07-10 NOTE — TELEPHONE ENCOUNTER
See email below. Last OV (2/20/2018) said to follow up around 8/20/18 for med check. Do you want pt in any sooner?

## 2018-07-10 NOTE — TELEPHONE ENCOUNTER
From: Inna Monsalve June Ebb Prim  To: Wilner Varela MD  Sent: 7/10/2018 12:23 PM CDT  Subject: Visit Follow-up Question    My test results from Dr. Erika Moore are in. I was wondering when you want to see me next so I can schedule an appointment.   I don't have any quest

## 2018-08-31 ENCOUNTER — OFFICE VISIT (OUTPATIENT)
Dept: INTERNAL MEDICINE CLINIC | Facility: CLINIC | Age: 63
End: 2018-08-31

## 2018-08-31 VITALS
TEMPERATURE: 99 F | WEIGHT: 172.63 LBS | SYSTOLIC BLOOD PRESSURE: 146 MMHG | DIASTOLIC BLOOD PRESSURE: 84 MMHG | HEART RATE: 60 BPM | RESPIRATION RATE: 12 BRPM | BODY MASS INDEX: 32.17 KG/M2 | HEIGHT: 61.5 IN

## 2018-08-31 DIAGNOSIS — K76.0 FATTY LIVER: ICD-10-CM

## 2018-08-31 DIAGNOSIS — E66.9 CLASS 1 OBESITY WITH BODY MASS INDEX (BMI) OF 32.0 TO 32.9 IN ADULT, UNSPECIFIED OBESITY TYPE, UNSPECIFIED WHETHER SERIOUS COMORBIDITY PRESENT: ICD-10-CM

## 2018-08-31 DIAGNOSIS — Z00.00 PHYSICAL EXAM, ANNUAL: ICD-10-CM

## 2018-08-31 DIAGNOSIS — I10 ESSENTIAL HYPERTENSION: Primary | ICD-10-CM

## 2018-08-31 PROCEDURE — 99214 OFFICE O/P EST MOD 30 MIN: CPT | Performed by: INTERNAL MEDICINE

## 2018-08-31 RX ORDER — TRIAMTERENE AND HYDROCHLOROTHIAZIDE 37.5; 25 MG/1; MG/1
1 TABLET ORAL DAILY
Qty: 30 TABLET | Refills: 1 | Status: SHIPPED | OUTPATIENT
Start: 2018-08-31 | End: 2018-09-30

## 2018-08-31 NOTE — PROGRESS NOTES
[address] [facility]    CHIEF COMPLAINT:  Patient presents with:  Medication Follow-Up: no colonoscopy,  Did stool cards 10/17        HISTORY OF PRESENT ILLNESS:  Here for follow up. Htn: elevated today. Has been elevated at home more than it should be. Authorizing Provider:  Michael Poe MD        Collected:           06/28/2018 09:43 AM          Ordering Location:     [facility] Ultrasound Received:            06/28/2018 02:21 PM          Pathologist:           Sydnee Etienne MD Methodology: Histochemical stains are performed on formalin-fixed,   paraffin-embedded tissue sections. All staining processes are validated   by [facility] Department of Pathology to document appropriate   staining reactions.   Positive controls ar

## 2018-09-01 DIAGNOSIS — I10 ESSENTIAL HYPERTENSION: ICD-10-CM

## 2018-09-04 RX ORDER — LISINOPRIL 40 MG/1
TABLET ORAL
Qty: 90 TABLET | Refills: 0 | Status: SHIPPED | OUTPATIENT
Start: 2018-09-04 | End: 2018-11-24

## 2018-09-04 RX ORDER — METOPROLOL SUCCINATE 100 MG/1
TABLET, EXTENDED RELEASE ORAL
Qty: 90 TABLET | Refills: 0 | Status: SHIPPED | OUTPATIENT
Start: 2018-09-04 | End: 2018-11-24

## 2018-09-26 ENCOUNTER — LABORATORY ENCOUNTER (OUTPATIENT)
Dept: LAB | Age: 63
End: 2018-09-26
Attending: INTERNAL MEDICINE
Payer: COMMERCIAL

## 2018-09-26 DIAGNOSIS — K75.81 NASH (NONALCOHOLIC STEATOHEPATITIS): ICD-10-CM

## 2018-09-26 DIAGNOSIS — D69.6 THROMBOCYTOPENIA (HCC): Primary | ICD-10-CM

## 2018-09-26 DIAGNOSIS — Z00.00 PHYSICAL EXAM, ANNUAL: ICD-10-CM

## 2018-09-26 DIAGNOSIS — D72.819 LEUKOPENIA, UNSPECIFIED TYPE: ICD-10-CM

## 2018-09-26 LAB
ALBUMIN SERPL-MCNC: 3.6 G/DL (ref 3.5–4.8)
ALBUMIN/GLOB SERPL: 0.9 {RATIO} (ref 1–2)
ALP LIVER SERPL-CCNC: 102 U/L (ref 50–130)
ALT SERPL-CCNC: 117 U/L (ref 14–54)
ANION GAP SERPL CALC-SCNC: 9 MMOL/L (ref 0–18)
AST SERPL-CCNC: 76 U/L (ref 15–41)
BASOPHILS # BLD AUTO: 0.02 X10(3) UL (ref 0–0.1)
BASOPHILS NFR BLD AUTO: 0.5 %
BILIRUB DIRECT SERPL-MCNC: 0.2 MG/DL (ref 0.1–0.5)
BILIRUB SERPL-MCNC: 0.8 MG/DL (ref 0.1–2)
BUN BLD-MCNC: 18 MG/DL (ref 8–20)
BUN/CREAT SERPL: 20.5 (ref 10–20)
CALCIUM BLD-MCNC: 9 MG/DL (ref 8.3–10.3)
CHLORIDE SERPL-SCNC: 101 MMOL/L (ref 101–111)
CO2 SERPL-SCNC: 28 MMOL/L (ref 22–32)
CREAT BLD-MCNC: 0.88 MG/DL (ref 0.55–1.02)
EOSINOPHIL # BLD AUTO: 0.12 X10(3) UL (ref 0–0.3)
EOSINOPHIL NFR BLD AUTO: 3.1 %
ERYTHROCYTE [DISTWIDTH] IN BLOOD BY AUTOMATED COUNT: 11.9 % (ref 11.5–16)
GLOBULIN PLAS-MCNC: 4.1 G/DL (ref 2.5–4)
GLUCOSE BLD-MCNC: 97 MG/DL (ref 70–99)
HCT VFR BLD AUTO: 45.7 % (ref 34–50)
HGB BLD-MCNC: 15.3 G/DL (ref 12–16)
IMMATURE GRANULOCYTE COUNT: 0.01 X10(3) UL (ref 0–1)
IMMATURE GRANULOCYTE RATIO %: 0.3 %
LYMPHOCYTES # BLD AUTO: 0.78 X10(3) UL (ref 0.9–4)
LYMPHOCYTES NFR BLD AUTO: 20.2 %
M PROTEIN MFR SERPL ELPH: 7.7 G/DL (ref 6.1–8.3)
MCH RBC QN AUTO: 31.9 PG (ref 27–33.2)
MCHC RBC AUTO-ENTMCNC: 33.5 G/DL (ref 31–37)
MCV RBC AUTO: 95.4 FL (ref 81–100)
MONOCYTES # BLD AUTO: 0.52 X10(3) UL (ref 0.1–1)
MONOCYTES NFR BLD AUTO: 13.5 %
NEUTROPHIL ABS PRELIM: 2.41 X10 (3) UL (ref 1.3–6.7)
NEUTROPHILS # BLD AUTO: 2.41 X10(3) UL (ref 1.3–6.7)
NEUTROPHILS NFR BLD AUTO: 62.4 %
OSMOLALITY SERPL CALC.SUM OF ELEC: 288 MOSM/KG (ref 275–295)
PLATELET # BLD AUTO: 146 10(3)UL (ref 150–450)
POTASSIUM SERPL-SCNC: 3.7 MMOL/L (ref 3.6–5.1)
RBC # BLD AUTO: 4.79 X10(6)UL (ref 3.8–5.1)
RED CELL DISTRIBUTION WIDTH-SD: 41.1 FL (ref 35.1–46.3)
SODIUM SERPL-SCNC: 138 MMOL/L (ref 136–144)
TSI SER-ACNC: 2.11 MIU/ML (ref 0.35–5.5)
WBC # BLD AUTO: 3.9 X10(3) UL (ref 4–13)

## 2018-09-26 PROCEDURE — 36415 COLL VENOUS BLD VENIPUNCTURE: CPT

## 2018-09-26 PROCEDURE — 80053 COMPREHEN METABOLIC PANEL: CPT

## 2018-09-26 PROCEDURE — 82248 BILIRUBIN DIRECT: CPT

## 2018-09-26 PROCEDURE — 84443 ASSAY THYROID STIM HORMONE: CPT

## 2018-09-26 PROCEDURE — 85025 COMPLETE CBC W/AUTO DIFF WBC: CPT

## 2018-09-30 DIAGNOSIS — I10 ESSENTIAL HYPERTENSION: ICD-10-CM

## 2018-10-01 RX ORDER — TRIAMTERENE AND HYDROCHLOROTHIAZIDE 37.5; 25 MG/1; MG/1
1 TABLET ORAL DAILY
Qty: 90 TABLET | Refills: 1 | Status: SHIPPED | OUTPATIENT
Start: 2018-10-01 | End: 2019-01-26

## 2018-10-18 ENCOUNTER — LABORATORY ENCOUNTER (OUTPATIENT)
Dept: LAB | Age: 63
End: 2018-10-18
Attending: INTERNAL MEDICINE
Payer: COMMERCIAL

## 2018-10-18 DIAGNOSIS — D72.819 LEUKOPENIA, UNSPECIFIED TYPE: ICD-10-CM

## 2018-10-18 DIAGNOSIS — D69.6 THROMBOCYTOPENIA (HCC): ICD-10-CM

## 2018-10-18 PROCEDURE — 36415 COLL VENOUS BLD VENIPUNCTURE: CPT

## 2018-10-18 PROCEDURE — 85025 COMPLETE CBC W/AUTO DIFF WBC: CPT

## 2018-10-23 ENCOUNTER — OFFICE VISIT (OUTPATIENT)
Dept: INTERNAL MEDICINE CLINIC | Facility: CLINIC | Age: 63
End: 2018-10-23

## 2018-10-23 VITALS
RESPIRATION RATE: 12 BRPM | BODY MASS INDEX: 31.06 KG/M2 | HEIGHT: 61.5 IN | HEART RATE: 60 BPM | WEIGHT: 166.63 LBS | DIASTOLIC BLOOD PRESSURE: 70 MMHG | SYSTOLIC BLOOD PRESSURE: 110 MMHG | TEMPERATURE: 98 F

## 2018-10-23 DIAGNOSIS — Z01.419 VISIT FOR PELVIC EXAM: Primary | ICD-10-CM

## 2018-10-23 DIAGNOSIS — R00.2 PALPITATIONS: ICD-10-CM

## 2018-10-23 DIAGNOSIS — Z12.11 SCREENING FOR COLON CANCER: ICD-10-CM

## 2018-10-23 DIAGNOSIS — Z78.0 POSTMENOPAUSAL: ICD-10-CM

## 2018-10-23 DIAGNOSIS — Z12.31 SCREENING MAMMOGRAM, ENCOUNTER FOR: ICD-10-CM

## 2018-10-23 DIAGNOSIS — Z00.00 PHYSICAL EXAM, ANNUAL: ICD-10-CM

## 2018-10-23 PROCEDURE — 87624 HPV HI-RISK TYP POOLED RSLT: CPT | Performed by: INTERNAL MEDICINE

## 2018-10-23 PROCEDURE — 99396 PREV VISIT EST AGE 40-64: CPT | Performed by: INTERNAL MEDICINE

## 2018-10-23 PROCEDURE — 88175 CYTOPATH C/V AUTO FLUID REDO: CPT | Performed by: INTERNAL MEDICINE

## 2018-10-23 NOTE — PROGRESS NOTES
[facility]    CHIEF COMPLAINT: Patient presents with:  Routine Physical: 4/22/14 normal pap, neg HPV. Had mammogram 4/14/18.  10/22/17 did stool cards. Already had flu shot.  Tdap 2/1/15        HPI:   Olamide Nava is a 58year old female w • Dysuria    • Elevated alkaline phosphatase level 12/9/2013   • High blood pressure    • HTN (hypertension)    • Lung nodule 12/9/2013   • Obesity    • Strabismus    • Unspecified essential hypertension 6/29/2012   • Visual impairment     prescription g distress  SKIN: no rashes,no suspicious lesions  HEENT: atraumatic, normocephalic,ears and throat are clear  EYES:PERRLA, conjunctiva are clear  NECK: supple,no adenopathy,no bruits  CHEST: no chest tenderness  LUNGS: clear to auscultation  CARDIO: nl s1 a for: A1C   Vitamin D:    Lab Results   Component Value Date    VITD 42.9 10/03/2017           ASSESSMENT AND PLAN:   Leonor Ghosh Rose Marie Reyes is a 58year old female who presents for a complete physical exam.   1. Physical exam, annual  Labs reviewed.  Has not do

## 2018-10-27 PROCEDURE — 82274 ASSAY TEST FOR BLOOD FECAL: CPT

## 2018-10-31 ENCOUNTER — APPOINTMENT (OUTPATIENT)
Dept: LAB | Age: 63
End: 2018-10-31
Attending: INTERNAL MEDICINE
Payer: COMMERCIAL

## 2018-10-31 DIAGNOSIS — Z00.00 PHYSICAL EXAM, ANNUAL: ICD-10-CM

## 2018-10-31 PROCEDURE — 80061 LIPID PANEL: CPT

## 2018-10-31 PROCEDURE — 36415 COLL VENOUS BLD VENIPUNCTURE: CPT

## 2018-11-02 ENCOUNTER — APPOINTMENT (OUTPATIENT)
Dept: LAB | Age: 63
End: 2018-11-02
Attending: INTERNAL MEDICINE
Payer: COMMERCIAL

## 2018-11-02 DIAGNOSIS — Z12.11 SCREENING FOR COLON CANCER: ICD-10-CM

## 2018-11-03 ENCOUNTER — HOSPITAL ENCOUNTER (OUTPATIENT)
Dept: BONE DENSITY | Age: 63
Discharge: HOME | End: 2018-11-03
Attending: INTERNAL MEDICINE
Payer: COMMERCIAL

## 2018-11-03 DIAGNOSIS — Z78.0 POSTMENOPAUSAL: ICD-10-CM

## 2018-11-03 PROCEDURE — 77080 DXA BONE DENSITY AXIAL: CPT | Performed by: INTERNAL MEDICINE

## 2018-11-05 ENCOUNTER — OFFICE VISIT (OUTPATIENT)
Dept: SURGERY | Facility: CLINIC | Age: 63
End: 2018-11-05

## 2018-11-05 ENCOUNTER — APPOINTMENT (OUTPATIENT)
Dept: LAB | Facility: HOSPITAL | Age: 63
End: 2018-11-05
Attending: INTERNAL MEDICINE
Payer: COMMERCIAL

## 2018-11-05 VITALS
HEART RATE: 55 BPM | OXYGEN SATURATION: 96 % | WEIGHT: 168 LBS | BODY MASS INDEX: 31 KG/M2 | DIASTOLIC BLOOD PRESSURE: 87 MMHG | RESPIRATION RATE: 16 BRPM | SYSTOLIC BLOOD PRESSURE: 132 MMHG

## 2018-11-05 DIAGNOSIS — R00.2 PALPITATIONS: ICD-10-CM

## 2018-11-05 DIAGNOSIS — K75.81 NASH (NONALCOHOLIC STEATOHEPATITIS): Primary | ICD-10-CM

## 2018-11-05 PROCEDURE — 93010 ELECTROCARDIOGRAM REPORT: CPT | Performed by: INTERNAL MEDICINE

## 2018-11-05 PROCEDURE — 93005 ELECTROCARDIOGRAM TRACING: CPT

## 2018-11-05 NOTE — PROGRESS NOTES
Spoke to pt, aware of results and to rtc if palpitations become more frequent. Pt voiced understanding.

## 2018-11-05 NOTE — PROGRESS NOTES
[facility]  [address], [address]  [address]  [phone] ([phone]) Grandmother 40      Social History    Tobacco Use      Smoking status: Never Smoker      Smokeless tobacco: Never Used    Alcohol use: No      Alcohol/week: 0.0 oz    Drug use: No         Current Outpatient Medications:   •  TRIAMTERENE-HCTZ 37.5-25 MG Ora (mobile)  [email or web address]

## 2018-11-24 DIAGNOSIS — I10 ESSENTIAL HYPERTENSION: ICD-10-CM

## 2018-11-27 RX ORDER — LISINOPRIL 40 MG/1
TABLET ORAL
Qty: 90 TABLET | Refills: 0 | Status: SHIPPED | OUTPATIENT
Start: 2018-11-27 | End: 2019-02-20

## 2018-11-27 RX ORDER — METOPROLOL SUCCINATE 100 MG/1
TABLET, EXTENDED RELEASE ORAL
Qty: 90 TABLET | Refills: 0 | Status: SHIPPED | OUTPATIENT
Start: 2018-11-27 | End: 2019-02-20

## 2018-12-01 ENCOUNTER — PRIOR ORIGINAL RECORDS (OUTPATIENT)
Dept: HEALTH INFORMATION MANAGEMENT | Facility: OTHER | Age: 63
End: 2018-12-01

## 2018-12-31 ENCOUNTER — HOSPITAL ENCOUNTER (OUTPATIENT)
Dept: CT IMAGING | Facility: HOSPITAL | Age: 63
Discharge: HOME | End: 2018-12-31
Attending: INTERNAL MEDICINE

## 2018-12-31 DIAGNOSIS — Z13.6 SCREENING FOR CARDIOVASCULAR CONDITION: ICD-10-CM

## 2019-01-26 ENCOUNTER — OFFICE VISIT (OUTPATIENT)
Dept: INTERNAL MEDICINE CLINIC | Facility: CLINIC | Age: 64
End: 2019-01-26

## 2019-01-26 VITALS
HEIGHT: 61.5 IN | WEIGHT: 171.81 LBS | TEMPERATURE: 78 F | SYSTOLIC BLOOD PRESSURE: 120 MMHG | DIASTOLIC BLOOD PRESSURE: 74 MMHG | RESPIRATION RATE: 12 BRPM | HEART RATE: 64 BPM | BODY MASS INDEX: 32.02 KG/M2

## 2019-01-26 DIAGNOSIS — I10 ESSENTIAL HYPERTENSION: ICD-10-CM

## 2019-01-26 DIAGNOSIS — I25.10 CORONARY ARTERY DISEASE INVOLVING NATIVE CORONARY ARTERY OF NATIVE HEART WITHOUT ANGINA PECTORIS: ICD-10-CM

## 2019-01-26 PROCEDURE — 99214 OFFICE O/P EST MOD 30 MIN: CPT | Performed by: INTERNAL MEDICINE

## 2019-01-26 RX ORDER — TRIAMTERENE AND HYDROCHLOROTHIAZIDE 37.5; 25 MG/1; MG/1
0.5 TABLET ORAL DAILY
Qty: 90 TABLET | Refills: 1 | COMMUNITY
Start: 2019-01-26 | End: 2019-09-07

## 2019-01-26 NOTE — PROGRESS NOTES
[facility]    CHIEF COMPLAINT:  Patient presents with:  Test Results        HISTORY OF PRESENT ILLNESS:  Here for follow up coronary artery calcium score. Had an elevated score of 344. She has no chest pain or shortness of breath.  She does have degrees    T Axis 65 degrees            ASSESSMENT AND PLAN:  1. Coronary artery disease involving native coronary artery of native heart without angina pectoris  Elevated coronary artery calcium score. Will do stress test and echo. Her LDL is 71.  Woul

## 2019-02-20 DIAGNOSIS — I10 ESSENTIAL HYPERTENSION: ICD-10-CM

## 2019-02-21 RX ORDER — LISINOPRIL 40 MG/1
TABLET ORAL
Qty: 90 TABLET | Refills: 0 | Status: SHIPPED | OUTPATIENT
Start: 2019-02-21 | End: 2019-05-23

## 2019-02-21 RX ORDER — METOPROLOL SUCCINATE 100 MG/1
TABLET, EXTENDED RELEASE ORAL
Qty: 90 TABLET | Refills: 0 | Status: SHIPPED | OUTPATIENT
Start: 2019-02-21 | End: 2019-05-23

## 2019-02-23 ENCOUNTER — HOSPITAL ENCOUNTER (OUTPATIENT)
Dept: CV DIAGNOSTICS | Facility: HOSPITAL | Age: 64
Discharge: HOME | End: 2019-02-23
Attending: INTERNAL MEDICINE
Payer: COMMERCIAL

## 2019-02-23 DIAGNOSIS — I25.10 CORONARY ARTERY DISEASE INVOLVING NATIVE CORONARY ARTERY OF NATIVE HEART WITHOUT ANGINA PECTORIS: ICD-10-CM

## 2019-02-23 LAB — LV EF: 62 .5 %

## 2019-02-23 PROCEDURE — 93306 TTE W/DOPPLER COMPLETE: CPT | Performed by: INTERNAL MEDICINE

## 2019-02-23 PROCEDURE — 93017 CV STRESS TEST TRACING ONLY: CPT | Performed by: INTERNAL MEDICINE

## 2019-02-23 PROCEDURE — 93018 CV STRESS TEST I&R ONLY: CPT | Performed by: INTERNAL MEDICINE

## 2019-02-23 PROCEDURE — 93350 STRESS TTE ONLY: CPT | Performed by: INTERNAL MEDICINE

## 2019-02-27 DIAGNOSIS — R94.39 ABNORMAL STRESS ECHO: ICD-10-CM

## 2019-02-27 DIAGNOSIS — R93.1 ELEVATED CORONARY ARTERY CALCIUM SCORE: Primary | ICD-10-CM

## 2019-02-27 DIAGNOSIS — R93.1 ABNORMAL ECHOCARDIOGRAM: ICD-10-CM

## 2019-03-26 DIAGNOSIS — I10 ESSENTIAL HYPERTENSION: ICD-10-CM

## 2019-03-27 RX ORDER — TRIAMTERENE AND HYDROCHLOROTHIAZIDE 37.5; 25 MG/1; MG/1
1 TABLET ORAL DAILY
Qty: 90 TABLET | Refills: 0 | OUTPATIENT
Start: 2019-03-27

## 2019-04-18 ENCOUNTER — LABORATORY ENCOUNTER (OUTPATIENT)
Dept: LAB | Age: 64
End: 2019-04-18
Attending: INTERNAL MEDICINE
Payer: COMMERCIAL

## 2019-04-18 DIAGNOSIS — K75.81 NASH (NONALCOHOLIC STEATOHEPATITIS): ICD-10-CM

## 2019-04-18 DIAGNOSIS — I10 ESSENTIAL HYPERTENSION: ICD-10-CM

## 2019-04-18 PROCEDURE — 80053 COMPREHEN METABOLIC PANEL: CPT

## 2019-04-18 PROCEDURE — 80061 LIPID PANEL: CPT

## 2019-04-18 PROCEDURE — 82248 BILIRUBIN DIRECT: CPT

## 2019-04-18 PROCEDURE — 36415 COLL VENOUS BLD VENIPUNCTURE: CPT

## 2019-04-27 DIAGNOSIS — K76.0 NAFLD (NONALCOHOLIC FATTY LIVER DISEASE): Primary | ICD-10-CM

## 2019-05-04 ENCOUNTER — OFFICE VISIT (OUTPATIENT)
Dept: INTERNAL MEDICINE CLINIC | Facility: CLINIC | Age: 64
End: 2019-05-04

## 2019-05-04 VITALS
WEIGHT: 169.81 LBS | RESPIRATION RATE: 12 BRPM | TEMPERATURE: 99 F | HEIGHT: 61.5 IN | BODY MASS INDEX: 31.65 KG/M2 | HEART RATE: 60 BPM | DIASTOLIC BLOOD PRESSURE: 80 MMHG | SYSTOLIC BLOOD PRESSURE: 120 MMHG

## 2019-05-04 DIAGNOSIS — I10 ESSENTIAL HYPERTENSION: Primary | ICD-10-CM

## 2019-05-04 DIAGNOSIS — Z00.00 PHYSICAL EXAM, ANNUAL: ICD-10-CM

## 2019-05-04 DIAGNOSIS — I25.10 CORONARY ARTERY DISEASE INVOLVING NATIVE CORONARY ARTERY OF NATIVE HEART WITHOUT ANGINA PECTORIS: ICD-10-CM

## 2019-05-04 DIAGNOSIS — R42 VERTIGO: ICD-10-CM

## 2019-05-04 DIAGNOSIS — K76.0 FATTY LIVER: ICD-10-CM

## 2019-05-04 PROCEDURE — 99214 OFFICE O/P EST MOD 30 MIN: CPT | Performed by: INTERNAL MEDICINE

## 2019-05-04 RX ORDER — MECLIZINE HYDROCHLORIDE 25 MG/1
TABLET ORAL 2 TIMES DAILY PRN
Qty: 30 TABLET | Refills: 0 | Status: SHIPPED | OUTPATIENT
Start: 2019-05-04 | End: 2019-11-04 | Stop reason: ALTCHOICE

## 2019-05-04 NOTE — PROGRESS NOTES
[facility]    CHIEF COMPLAINT:  Patient presents with:  Medication Follow-Up  Dizziness: x 1 month. Various during the day and night. HISTORY OF PRESENT ILLNESS:  Here for med check. Htn: Taking meds regularly.  No chest pain, no shortne GENERAL: well developed, well nourished,in no apparent distress  HEENT: Oropharynx normal. No tonsillar enlargement or exudates.    EYES:PERRLA, EOMI, conjunctiva are clear  NECK: supple,no adenopathy,no bruits  LUNGS: clear to auscultation  CARDIO: RRR w Vertigo  Trial meclizine. Cautioned about driving when drowsy or dizzy. Trial vestibular PT. Given exercises to do. - Meclizine HCl 25 MG Oral Tab; Take 0.5-1 tablets (12.5-25 mg total) by mouth 2 (two) times daily as needed.   Dispense: 30 tablet;

## 2019-05-04 NOTE — PATIENT INSTRUCTIONS
1. Please see the cardiologists for follow up of your echo, stress test and elevated coronary artery calcium score. 2. Check with dr. Pantera Hernández if it will be okay to put you on a statin given your elevated coronary artery calcium score.

## 2019-05-21 DIAGNOSIS — I10 ESSENTIAL HYPERTENSION: ICD-10-CM

## 2019-05-23 DIAGNOSIS — I10 ESSENTIAL HYPERTENSION: ICD-10-CM

## 2019-05-23 RX ORDER — LISINOPRIL 40 MG/1
TABLET ORAL
Qty: 90 TABLET | Refills: 1 | Status: SHIPPED | OUTPATIENT
Start: 2019-05-23 | End: 2019-05-23

## 2019-05-23 RX ORDER — LISINOPRIL 40 MG/1
40 TABLET ORAL
Qty: 90 TABLET | Refills: 0 | Status: SHIPPED | OUTPATIENT
Start: 2019-05-23 | End: 2019-09-07

## 2019-05-23 RX ORDER — METOPROLOL SUCCINATE 100 MG/1
100 TABLET, EXTENDED RELEASE ORAL DAILY
Qty: 90 TABLET | Refills: 0 | Status: SHIPPED | OUTPATIENT
Start: 2019-05-23 | End: 2019-11-04 | Stop reason: ALTCHOICE

## 2019-05-23 RX ORDER — METOPROLOL SUCCINATE 100 MG/1
TABLET, EXTENDED RELEASE ORAL
Qty: 90 TABLET | Refills: 1 | Status: SHIPPED | OUTPATIENT
Start: 2019-05-23 | End: 2019-09-07

## 2019-05-23 RX ORDER — LISINOPRIL 40 MG/1
TABLET ORAL
Qty: 90 TABLET | Refills: 0 | OUTPATIENT
Start: 2019-05-23

## 2019-05-23 RX ORDER — METOPROLOL SUCCINATE 100 MG/1
TABLET, EXTENDED RELEASE ORAL
Qty: 90 TABLET | Refills: 0 | OUTPATIENT
Start: 2019-05-23

## 2019-09-07 DIAGNOSIS — I10 ESSENTIAL HYPERTENSION: ICD-10-CM

## 2019-09-09 RX ORDER — TRIAMTERENE AND HYDROCHLOROTHIAZIDE 37.5; 25 MG/1; MG/1
0.5 TABLET ORAL DAILY
Qty: 15 TABLET | Refills: 0 | Status: SHIPPED | OUTPATIENT
Start: 2019-09-09 | End: 2019-10-05

## 2019-09-09 RX ORDER — LISINOPRIL 40 MG/1
40 TABLET ORAL
Qty: 30 TABLET | Refills: 0 | Status: SHIPPED | OUTPATIENT
Start: 2019-09-09 | End: 2019-10-05

## 2019-09-09 RX ORDER — METOPROLOL SUCCINATE 100 MG/1
100 TABLET, EXTENDED RELEASE ORAL DAILY
Qty: 30 TABLET | Refills: 0 | Status: SHIPPED | OUTPATIENT
Start: 2019-09-09 | End: 2019-10-05

## 2019-09-09 NOTE — TELEPHONE ENCOUNTER
Rx approved per protocol. 30 days only sent. Pt requires Cardiology appt. Replied to pt on [email or web address] to make an appt with cards. Provided contact information and address.

## 2019-09-18 ENCOUNTER — TELEPHONE (OUTPATIENT)
Dept: CARDIOLOGY | Age: 64
End: 2019-09-18

## 2019-09-20 ENCOUNTER — APPOINTMENT (OUTPATIENT)
Dept: CARDIOLOGY | Age: 64
End: 2019-09-20

## 2019-09-23 ENCOUNTER — OFFICE VISIT (OUTPATIENT)
Dept: CARDIOLOGY | Age: 64
End: 2019-09-23

## 2019-09-23 VITALS
BODY MASS INDEX: 32.47 KG/M2 | HEART RATE: 60 BPM | HEIGHT: 61 IN | DIASTOLIC BLOOD PRESSURE: 60 MMHG | SYSTOLIC BLOOD PRESSURE: 114 MMHG | WEIGHT: 172 LBS

## 2019-09-23 DIAGNOSIS — R93.1 AGATSTON CORONARY ARTERY CALCIUM SCORE GREATER THAN 400: Primary | ICD-10-CM

## 2019-09-23 DIAGNOSIS — I10 ESSENTIAL HYPERTENSION: ICD-10-CM

## 2019-09-23 PROCEDURE — 99202 OFFICE O/P NEW SF 15 MIN: CPT | Performed by: INTERNAL MEDICINE

## 2019-09-23 PROCEDURE — 3078F DIAST BP <80 MM HG: CPT | Performed by: INTERNAL MEDICINE

## 2019-09-23 PROCEDURE — 3074F SYST BP LT 130 MM HG: CPT | Performed by: INTERNAL MEDICINE

## 2019-09-23 RX ORDER — METOPROLOL SUCCINATE 100 MG/1
100 TABLET, EXTENDED RELEASE ORAL DAILY
COMMUNITY
Start: 2019-05-23

## 2019-09-23 RX ORDER — TRIAMTERENE AND HYDROCHLOROTHIAZIDE 37.5; 25 MG/1; MG/1
0.5 TABLET ORAL DAILY
COMMUNITY
Start: 2019-09-09

## 2019-09-23 RX ORDER — LISINOPRIL 40 MG/1
40 TABLET ORAL
COMMUNITY
Start: 2019-09-09

## 2019-09-23 RX ORDER — MULTIVITAMIN/IRON/FOLIC ACID 18MG-0.4MG
1 TABLET ORAL DAILY
COMMUNITY

## 2019-09-23 RX ORDER — SOY ISOFLAVONE 40 MG
1 TABLET ORAL DAILY
COMMUNITY

## 2019-09-23 RX ORDER — MECLIZINE HYDROCHLORIDE 25 MG/1
12.5-25 TABLET ORAL
COMMUNITY
Start: 2019-05-04 | End: 2019-09-23 | Stop reason: CLARIF

## 2019-09-23 SDOH — HEALTH STABILITY: MENTAL HEALTH: HOW OFTEN DO YOU HAVE A DRINK CONTAINING ALCOHOL?: NEVER

## 2019-09-23 SDOH — SOCIAL STABILITY: SOCIAL NETWORK: ARE YOU MARRIED, WIDOWED, DIVORCED, SEPARATED, NEVER MARRIED, OR LIVING WITH A PARTNER?: MARRIED

## 2019-09-23 ASSESSMENT — ENCOUNTER SYMPTOMS
HEMOPTYSIS: 0
WEIGHT GAIN: 0
FEVER: 0
SUSPICIOUS LESIONS: 0
ALLERGIC/IMMUNOLOGIC COMMENTS: NO NEW FOOD ALLERGIES
COUGH: 0
WEIGHT LOSS: 0
BRUISES/BLEEDS EASILY: 0
CHILLS: 0
HEMATOCHEZIA: 0

## 2019-09-24 ENCOUNTER — HOSPITAL ENCOUNTER (OUTPATIENT)
Dept: MAMMOGRAPHY | Facility: HOSPITAL | Age: 64
Discharge: HOME | End: 2019-09-24
Attending: INTERNAL MEDICINE
Payer: COMMERCIAL

## 2019-09-24 DIAGNOSIS — Z12.31 SCREENING MAMMOGRAM, ENCOUNTER FOR: ICD-10-CM

## 2019-09-24 PROCEDURE — 77067 SCR MAMMO BI INCL CAD: CPT | Performed by: INTERNAL MEDICINE

## 2019-09-24 PROCEDURE — 77063 BREAST TOMOSYNTHESIS BI: CPT | Performed by: INTERNAL MEDICINE

## 2019-09-27 ENCOUNTER — LAB ENCOUNTER (OUTPATIENT)
Dept: LAB | Facility: HOSPITAL | Age: 64
End: 2019-09-27
Attending: INTERNAL MEDICINE
Payer: COMMERCIAL

## 2019-09-27 ENCOUNTER — HOSPITAL ENCOUNTER (OUTPATIENT)
Dept: ULTRASOUND IMAGING | Facility: HOSPITAL | Age: 64
Discharge: HOME | End: 2019-09-27
Attending: INTERNAL MEDICINE
Payer: COMMERCIAL

## 2019-09-27 DIAGNOSIS — Z00.00 PHYSICAL EXAM, ANNUAL: ICD-10-CM

## 2019-09-27 DIAGNOSIS — K76.0 NAFLD (NONALCOHOLIC FATTY LIVER DISEASE): ICD-10-CM

## 2019-09-27 LAB
ALBUMIN SERPL-MCNC: 3.6 G/DL (ref 3.4–5)
ALBUMIN SERPL-MCNC: 3.7 G/DL (ref 3.4–5)
ALBUMIN/GLOB SERPL: 0.9 {RATIO} (ref 1–2)
ALP LIVER SERPL-CCNC: 86 U/L (ref 50–130)
ALP LIVER SERPL-CCNC: 88 U/L (ref 50–130)
ALT SERPL-CCNC: 63 U/L (ref 13–56)
ALT SERPL-CCNC: 65 U/L (ref 13–56)
ANION GAP SERPL CALC-SCNC: 5 MMOL/L (ref 0–18)
AST SERPL-CCNC: 42 U/L (ref 15–37)
AST SERPL-CCNC: 47 U/L (ref 15–37)
BASOPHILS # BLD AUTO: 0.03 X10(3) UL (ref 0–0.2)
BASOPHILS NFR BLD AUTO: 0.8 %
BILIRUB DIRECT SERPL-MCNC: 0.2 MG/DL (ref 0–0.2)
BILIRUB SERPL-MCNC: 0.9 MG/DL (ref 0.1–2)
BILIRUB SERPL-MCNC: 1 MG/DL (ref 0.1–2)
BUN BLD-MCNC: 16 MG/DL (ref 7–18)
BUN/CREAT SERPL: 18 (ref 10–20)
CALCIUM BLD-MCNC: 8.8 MG/DL (ref 8.5–10.1)
CHLORIDE SERPL-SCNC: 105 MMOL/L (ref 98–112)
CHOLEST SMN-MCNC: 136 MG/DL (ref ?–200)
CO2 SERPL-SCNC: 30 MMOL/L (ref 21–32)
CREAT BLD-MCNC: 0.89 MG/DL (ref 0.55–1.02)
DEPRECATED RDW RBC AUTO: 41.2 FL (ref 35.1–46.3)
EOSINOPHIL # BLD AUTO: 0.1 X10(3) UL (ref 0–0.7)
EOSINOPHIL NFR BLD AUTO: 2.6 %
ERYTHROCYTE [DISTWIDTH] IN BLOOD BY AUTOMATED COUNT: 11.9 % (ref 11–15)
GLOBULIN PLAS-MCNC: 4 G/DL (ref 2.8–4.4)
GLUCOSE BLD-MCNC: 103 MG/DL (ref 70–99)
HCT VFR BLD AUTO: 44.3 % (ref 35–48)
HDLC SERPL-MCNC: 38 MG/DL (ref 40–59)
HGB BLD-MCNC: 15 G/DL (ref 12–16)
IMM GRANULOCYTES # BLD AUTO: 0 X10(3) UL (ref 0–1)
IMM GRANULOCYTES NFR BLD: 0 %
LDLC SERPL CALC-MCNC: 78 MG/DL (ref ?–100)
LYMPHOCYTES # BLD AUTO: 0.85 X10(3) UL (ref 1–4)
LYMPHOCYTES NFR BLD AUTO: 22.1 %
M PROTEIN MFR SERPL ELPH: 7.6 G/DL (ref 6.4–8.2)
M PROTEIN MFR SERPL ELPH: 7.7 G/DL (ref 6.4–8.2)
MCH RBC QN AUTO: 31.7 PG (ref 26–34)
MCHC RBC AUTO-ENTMCNC: 33.9 G/DL (ref 31–37)
MCV RBC AUTO: 93.7 FL (ref 80–100)
MONOCYTES # BLD AUTO: 0.53 X10(3) UL (ref 0.1–1)
MONOCYTES NFR BLD AUTO: 13.8 %
NEUTROPHILS # BLD AUTO: 2.33 X10 (3) UL (ref 1.5–7.7)
NEUTROPHILS # BLD AUTO: 2.33 X10(3) UL (ref 1.5–7.7)
NEUTROPHILS NFR BLD AUTO: 60.7 %
NONHDLC SERPL-MCNC: 98 MG/DL (ref ?–130)
OSMOLALITY SERPL CALC.SUM OF ELEC: 291 MOSM/KG (ref 275–295)
PLATELET # BLD AUTO: 154 10(3)UL (ref 150–450)
POTASSIUM SERPL-SCNC: 3.6 MMOL/L (ref 3.5–5.1)
RBC # BLD AUTO: 4.73 X10(6)UL (ref 3.8–5.3)
SODIUM SERPL-SCNC: 140 MMOL/L (ref 136–145)
TRIGL SERPL-MCNC: 100 MG/DL (ref 30–149)
TSI SER-ACNC: 2.21 MIU/ML (ref 0.36–3.74)
VLDLC SERPL CALC-MCNC: 20 MG/DL (ref 0–30)
WBC # BLD AUTO: 3.8 X10(3) UL (ref 4–11)

## 2019-09-27 PROCEDURE — 85025 COMPLETE CBC W/AUTO DIFF WBC: CPT

## 2019-09-27 PROCEDURE — 80053 COMPREHEN METABOLIC PANEL: CPT

## 2019-09-27 PROCEDURE — 76981 USE PARENCHYMA: CPT | Performed by: INTERNAL MEDICINE

## 2019-09-27 PROCEDURE — 82248 BILIRUBIN DIRECT: CPT

## 2019-09-27 PROCEDURE — 36415 COLL VENOUS BLD VENIPUNCTURE: CPT

## 2019-09-27 PROCEDURE — 80061 LIPID PANEL: CPT

## 2019-09-27 PROCEDURE — 76705 ECHO EXAM OF ABDOMEN: CPT | Performed by: INTERNAL MEDICINE

## 2019-09-27 PROCEDURE — 84443 ASSAY THYROID STIM HORMONE: CPT

## 2019-10-05 DIAGNOSIS — I10 ESSENTIAL HYPERTENSION: ICD-10-CM

## 2019-10-07 RX ORDER — LISINOPRIL 40 MG/1
TABLET ORAL
Qty: 90 TABLET | Refills: 0 | Status: SHIPPED | OUTPATIENT
Start: 2019-10-07 | End: 2020-01-02

## 2019-10-07 RX ORDER — METOPROLOL SUCCINATE 100 MG/1
TABLET, EXTENDED RELEASE ORAL
Qty: 90 TABLET | Refills: 0 | Status: SHIPPED | OUTPATIENT
Start: 2019-10-07 | End: 2020-01-02

## 2019-10-07 RX ORDER — TRIAMTERENE AND HYDROCHLOROTHIAZIDE 37.5; 25 MG/1; MG/1
TABLET ORAL
Qty: 45 TABLET | Refills: 0 | Status: SHIPPED | OUTPATIENT
Start: 2019-10-07 | End: 2020-01-02

## 2019-10-28 ENCOUNTER — TELEPHONE (OUTPATIENT)
Dept: INTERNAL MEDICINE CLINIC | Facility: CLINIC | Age: 64
End: 2019-10-28

## 2019-10-28 DIAGNOSIS — R74.8 ELEVATED LIVER ENZYMES: ICD-10-CM

## 2019-10-28 DIAGNOSIS — K76.0 FATTY LIVER: Primary | ICD-10-CM

## 2019-10-29 NOTE — TELEPHONE ENCOUNTER
Received fax from [address] [facility],[address] requesting new/updated referral to Dr. Roberto Barbosa prior to patient's appointment on 11/4/19. Referral pended, please advise.

## 2019-11-04 ENCOUNTER — TELEPHONE (OUTPATIENT)
Dept: INTERNAL MEDICINE CLINIC | Facility: CLINIC | Age: 64
End: 2019-11-04

## 2019-11-04 ENCOUNTER — OFFICE VISIT (OUTPATIENT)
Dept: SURGERY | Facility: CLINIC | Age: 64
End: 2019-11-04

## 2019-11-04 ENCOUNTER — OFFICE VISIT (OUTPATIENT)
Dept: INTERNAL MEDICINE CLINIC | Facility: CLINIC | Age: 64
End: 2019-11-04

## 2019-11-04 VITALS
BODY MASS INDEX: 32.47 KG/M2 | HEIGHT: 61 IN | SYSTOLIC BLOOD PRESSURE: 126 MMHG | TEMPERATURE: 98 F | OXYGEN SATURATION: 97 % | DIASTOLIC BLOOD PRESSURE: 76 MMHG | RESPIRATION RATE: 16 BRPM | HEART RATE: 56 BPM | WEIGHT: 172 LBS

## 2019-11-04 VITALS
WEIGHT: 172 LBS | RESPIRATION RATE: 16 BRPM | OXYGEN SATURATION: 97 % | BODY MASS INDEX: 32 KG/M2 | HEART RATE: 57 BPM | DIASTOLIC BLOOD PRESSURE: 86 MMHG | SYSTOLIC BLOOD PRESSURE: 129 MMHG

## 2019-11-04 DIAGNOSIS — Z12.31 ENCOUNTER FOR SCREENING MAMMOGRAM FOR BREAST CANCER: ICD-10-CM

## 2019-11-04 DIAGNOSIS — Z12.11 SCREENING FOR COLON CANCER: ICD-10-CM

## 2019-11-04 DIAGNOSIS — Z00.00 PHYSICAL EXAM, ANNUAL: Primary | ICD-10-CM

## 2019-11-04 DIAGNOSIS — K76.0 NAFLD (NONALCOHOLIC FATTY LIVER DISEASE): Primary | ICD-10-CM

## 2019-11-04 DIAGNOSIS — K76.0 FATTY LIVER: ICD-10-CM

## 2019-11-04 DIAGNOSIS — Z23 NEED FOR VACCINATION: Primary | ICD-10-CM

## 2019-11-04 DIAGNOSIS — Z23 NEED FOR VACCINATION: ICD-10-CM

## 2019-11-04 DIAGNOSIS — I10 ESSENTIAL HYPERTENSION: ICD-10-CM

## 2019-11-04 PROCEDURE — 90471 IMMUNIZATION ADMIN: CPT | Performed by: INTERNAL MEDICINE

## 2019-11-04 PROCEDURE — 99397 PER PM REEVAL EST PAT 65+ YR: CPT | Performed by: INTERNAL MEDICINE

## 2019-11-04 PROCEDURE — 90636 HEP A/HEP B VACC ADULT IM: CPT | Performed by: INTERNAL MEDICINE

## 2019-11-04 RX ORDER — MULTIVIT WITH MINERALS/LUTEIN
400 TABLET ORAL DAILY
COMMUNITY
End: 2021-10-18

## 2019-11-04 NOTE — PROGRESS NOTES
[facility]  [address], [address]  [address]  [phone] ([phone]) (diabetes) Maternal Grandmother 36      Social History    Tobacco Use      Smoking status: Never Smoker      Smokeless tobacco: Never Used    Alcohol use: No      Alcohol/week: 0.0 standard drinks    Drug use: No         Current Outpatient Medications:   • cardiologist says she does not need to go on one at this point  - HAV and HBV susceptible, encouraged to get vaccinated  - Follow up 1 year with liver elastography before visit    Yao Ellis MD  Director of Hepatology  Medical Director of Liver Trans

## 2019-11-04 NOTE — PROGRESS NOTES
[address] [facility]    CHIEF COMPLAINT: Patient presents with: Well Adult        HPI:   Roberta Ward is a 71year old female who presents for a complete physical exam. Symptoms: denies discharge, itching, burning or dysuria.     Htn: Taking meds reg unspecified 6/29/2012   • BPPV (benign paroxysmal positional vertigo)    • Cholecystitis with cholelithiasis 3/27/2014   • Disorder of liver     elevated liver functions   • Dysuria    • Elevated alkaline phosphatase level 12/9/2013   • High blood pressure kg/m²   Body mass index is 32.5 kg/m².    GENERAL: well developed, well nourished,in no apparent distress  SKIN: no rashes,no suspicious lesions  HEENT: atraumatic, normocephalic,ears and throat are clear  EYES:PERRLA, conjunctiva are clear  NECK: supple,no exercise, weight loss. shingrix at her local pharmacy if she desires.  - HEMOGLOBIN A1C; Future    2. Need for vaccination  - HEPATITIS A AND HEPATITIS B    3. Encounter for screening mammogram for breast cancer  - [facility] SCREENING BILAT (CPT=77067);  Future

## 2019-11-25 ENCOUNTER — APPOINTMENT (OUTPATIENT)
Dept: LAB | Facility: HOSPITAL | Age: 64
End: 2019-11-25
Attending: INTERNAL MEDICINE
Payer: COMMERCIAL

## 2019-11-25 DIAGNOSIS — Z12.11 SCREENING FOR COLON CANCER: ICD-10-CM

## 2019-12-06 ENCOUNTER — APPOINTMENT (OUTPATIENT)
Dept: LAB | Age: 64
End: 2019-12-06
Attending: INTERNAL MEDICINE
Payer: COMMERCIAL

## 2019-12-06 ENCOUNTER — NURSE ONLY (OUTPATIENT)
Dept: INTERNAL MEDICINE CLINIC | Facility: CLINIC | Age: 64
End: 2019-12-06

## 2019-12-06 DIAGNOSIS — Z00.00 PHYSICAL EXAM, ANNUAL: ICD-10-CM

## 2019-12-06 PROCEDURE — 90636 HEP A/HEP B VACC ADULT IM: CPT | Performed by: INTERNAL MEDICINE

## 2019-12-06 PROCEDURE — 90471 IMMUNIZATION ADMIN: CPT | Performed by: INTERNAL MEDICINE

## 2019-12-06 PROCEDURE — 36415 COLL VENOUS BLD VENIPUNCTURE: CPT

## 2019-12-06 PROCEDURE — 83036 HEMOGLOBIN GLYCOSYLATED A1C: CPT

## 2019-12-25 PROCEDURE — 82274 ASSAY TEST FOR BLOOD FECAL: CPT

## 2020-01-01 DIAGNOSIS — I10 ESSENTIAL HYPERTENSION: ICD-10-CM

## 2020-01-02 RX ORDER — TRIAMTERENE AND HYDROCHLOROTHIAZIDE 37.5; 25 MG/1; MG/1
TABLET ORAL
Qty: 45 TABLET | Refills: 0 | Status: SHIPPED | OUTPATIENT
Start: 2020-01-02 | End: 2020-03-30

## 2020-01-02 RX ORDER — LISINOPRIL 40 MG/1
TABLET ORAL
Qty: 90 TABLET | Refills: 0 | Status: SHIPPED | OUTPATIENT
Start: 2020-01-02 | End: 2020-03-30

## 2020-01-02 RX ORDER — METOPROLOL SUCCINATE 100 MG/1
TABLET, EXTENDED RELEASE ORAL
Qty: 90 TABLET | Refills: 0 | Status: SHIPPED | OUTPATIENT
Start: 2020-01-02 | End: 2020-03-30

## 2020-01-15 ENCOUNTER — TELEPHONE (OUTPATIENT)
Dept: CARDIOLOGY | Age: 65
End: 2020-01-15

## 2020-03-24 ENCOUNTER — TELEPHONE (OUTPATIENT)
Dept: CARDIOLOGY | Age: 65
End: 2020-03-24

## 2020-03-25 ENCOUNTER — APPOINTMENT (OUTPATIENT)
Dept: CARDIOLOGY | Age: 65
End: 2020-03-25

## 2020-03-25 ENCOUNTER — OFFICE VISIT (OUTPATIENT)
Dept: CARDIOLOGY | Age: 65
End: 2020-03-25

## 2020-03-25 VITALS
HEIGHT: 61 IN | HEART RATE: 64 BPM | BODY MASS INDEX: 35.5 KG/M2 | WEIGHT: 188 LBS | SYSTOLIC BLOOD PRESSURE: 118 MMHG | DIASTOLIC BLOOD PRESSURE: 80 MMHG

## 2020-03-25 DIAGNOSIS — R93.1 AGATSTON CORONARY ARTERY CALCIUM SCORE GREATER THAN 400: Primary | ICD-10-CM

## 2020-03-25 DIAGNOSIS — I10 ESSENTIAL HYPERTENSION: ICD-10-CM

## 2020-03-25 PROCEDURE — 99213 OFFICE O/P EST LOW 20 MIN: CPT | Performed by: INTERNAL MEDICINE

## 2020-03-25 RX ORDER — VITAMIN E 268 MG
400 CAPSULE ORAL DAILY
COMMUNITY

## 2020-03-28 DIAGNOSIS — I10 ESSENTIAL HYPERTENSION: ICD-10-CM

## 2020-03-30 RX ORDER — METOPROLOL SUCCINATE 100 MG/1
TABLET, EXTENDED RELEASE ORAL
Qty: 90 TABLET | Refills: 0 | Status: SHIPPED | OUTPATIENT
Start: 2020-03-30 | End: 2020-06-25

## 2020-03-30 RX ORDER — LISINOPRIL 40 MG/1
TABLET ORAL
Qty: 90 TABLET | Refills: 0 | Status: SHIPPED | OUTPATIENT
Start: 2020-03-30 | End: 2020-06-25

## 2020-03-30 RX ORDER — TRIAMTERENE AND HYDROCHLOROTHIAZIDE 37.5; 25 MG/1; MG/1
TABLET ORAL
Qty: 45 TABLET | Refills: 0 | Status: SHIPPED | OUTPATIENT
Start: 2020-03-30 | End: 2020-06-25

## 2020-04-01 ENCOUNTER — APPOINTMENT (OUTPATIENT)
Dept: CARDIOLOGY | Age: 65
End: 2020-04-01

## 2020-04-08 ENCOUNTER — APPOINTMENT (OUTPATIENT)
Dept: CARDIOLOGY | Age: 65
End: 2020-04-08

## 2020-04-27 ENCOUNTER — TELEPHONE (OUTPATIENT)
Dept: INTERNAL MEDICINE CLINIC | Facility: CLINIC | Age: 65
End: 2020-04-27

## 2020-04-27 NOTE — TELEPHONE ENCOUNTER
Virtual/Telephone Consent    Tye Leon verbally consents to a Telephone Check-In service on 5/8/20  Patient understands and accepts financial responsibility for any deductible, co-insurance and/or co-pays associated with this service.     Please ca

## 2020-05-08 ENCOUNTER — NURSE ONLY (OUTPATIENT)
Dept: INTERNAL MEDICINE CLINIC | Facility: CLINIC | Age: 65
End: 2020-05-08

## 2020-05-08 ENCOUNTER — VIRTUAL PHONE E/M (OUTPATIENT)
Dept: INTERNAL MEDICINE CLINIC | Facility: CLINIC | Age: 65
End: 2020-05-08

## 2020-05-08 ENCOUNTER — TELEPHONE (OUTPATIENT)
Dept: INTERNAL MEDICINE CLINIC | Facility: CLINIC | Age: 65
End: 2020-05-08

## 2020-05-08 VITALS — DIASTOLIC BLOOD PRESSURE: 77 MMHG | HEART RATE: 64 BPM | SYSTOLIC BLOOD PRESSURE: 116 MMHG

## 2020-05-08 DIAGNOSIS — I10 ESSENTIAL HYPERTENSION: Primary | ICD-10-CM

## 2020-05-08 DIAGNOSIS — Z23 NEED FOR VACCINATION: ICD-10-CM

## 2020-05-08 DIAGNOSIS — K76.0 FATTY LIVER: ICD-10-CM

## 2020-05-08 DIAGNOSIS — R93.1 ELEVATED CORONARY ARTERY CALCIUM SCORE: ICD-10-CM

## 2020-05-08 PROCEDURE — 90471 IMMUNIZATION ADMIN: CPT | Performed by: INTERNAL MEDICINE

## 2020-05-08 PROCEDURE — 90636 HEP A/HEP B VACC ADULT IM: CPT | Performed by: INTERNAL MEDICINE

## 2020-05-08 PROCEDURE — 99214 OFFICE O/P EST MOD 30 MIN: CPT | Performed by: INTERNAL MEDICINE

## 2020-05-08 NOTE — PROGRESS NOTES
Virtual Telephone Check-In    See psr/triage staff note for consent for virtual check in.      Duration of the service: 9 minutes      Sarah 72:  Patient presents with:  Medication Follow-Up        HISTORY OF PRESENT ILLNESS: phone.      DATA:  Results for orders placed or performed in visit on 12/06/19   HEMOGLOBIN A1C   Result Value Ref Range    HgbA1C 5.5 <5.7 %    Estimated Average Glucose 111 68 - 126 mg/dL      Lab Results   Component Value Date     (H) 09/27/2019 limitations of this visit as no physical exam could be performed. Every conscious effort was taken to allow for sufficient and adequate time. This billing was spent on reviewing labs, medications, radiology tests and decision making.   Appropriate medical

## 2020-05-12 ENCOUNTER — APPOINTMENT (OUTPATIENT)
Dept: LAB | Age: 65
End: 2020-05-12
Attending: INTERNAL MEDICINE
Payer: COMMERCIAL

## 2020-05-12 DIAGNOSIS — K76.0 FATTY LIVER: ICD-10-CM

## 2020-05-12 PROCEDURE — 36415 COLL VENOUS BLD VENIPUNCTURE: CPT

## 2020-05-12 PROCEDURE — 80053 COMPREHEN METABOLIC PANEL: CPT

## 2020-06-24 DIAGNOSIS — I10 ESSENTIAL HYPERTENSION: ICD-10-CM

## 2020-06-25 RX ORDER — METOPROLOL SUCCINATE 100 MG/1
TABLET, EXTENDED RELEASE ORAL
Qty: 90 TABLET | Refills: 0 | Status: SHIPPED | OUTPATIENT
Start: 2020-06-25 | End: 2020-09-21

## 2020-06-25 RX ORDER — LISINOPRIL 40 MG/1
TABLET ORAL
Qty: 90 TABLET | Refills: 0 | Status: SHIPPED | OUTPATIENT
Start: 2020-06-25 | End: 2020-09-21

## 2020-06-25 RX ORDER — TRIAMTERENE AND HYDROCHLOROTHIAZIDE 37.5; 25 MG/1; MG/1
TABLET ORAL
Qty: 45 TABLET | Refills: 0 | Status: SHIPPED | OUTPATIENT
Start: 2020-06-25 | End: 2020-09-21

## 2020-09-20 DIAGNOSIS — I10 ESSENTIAL HYPERTENSION: ICD-10-CM

## 2020-09-21 RX ORDER — TRIAMTERENE AND HYDROCHLOROTHIAZIDE 37.5; 25 MG/1; MG/1
TABLET ORAL
Qty: 45 TABLET | Refills: 0 | Status: SHIPPED | OUTPATIENT
Start: 2020-09-21 | End: 2020-12-15

## 2020-09-21 RX ORDER — METOPROLOL SUCCINATE 100 MG/1
TABLET, EXTENDED RELEASE ORAL
Qty: 90 TABLET | Refills: 0 | Status: SHIPPED | OUTPATIENT
Start: 2020-09-21 | End: 2020-12-15

## 2020-09-21 RX ORDER — LISINOPRIL 40 MG/1
TABLET ORAL
Qty: 90 TABLET | Refills: 0 | Status: SHIPPED | OUTPATIENT
Start: 2020-09-21 | End: 2020-12-15

## 2020-09-28 ENCOUNTER — HOSPITAL ENCOUNTER (OUTPATIENT)
Dept: MAMMOGRAPHY | Facility: HOSPITAL | Age: 65
Discharge: HOME | End: 2020-09-28
Attending: INTERNAL MEDICINE
Payer: COMMERCIAL

## 2020-09-28 ENCOUNTER — LABORATORY ENCOUNTER (OUTPATIENT)
Dept: LAB | Age: 65
End: 2020-09-28
Attending: INTERNAL MEDICINE
Payer: COMMERCIAL

## 2020-09-28 ENCOUNTER — HOSPITAL ENCOUNTER (OUTPATIENT)
Dept: ULTRASOUND IMAGING | Facility: HOSPITAL | Age: 65
Discharge: HOME | End: 2020-09-28
Attending: INTERNAL MEDICINE
Payer: COMMERCIAL

## 2020-09-28 DIAGNOSIS — I10 HTN (HYPERTENSION): Primary | ICD-10-CM

## 2020-09-28 DIAGNOSIS — Z12.31 ENCOUNTER FOR SCREENING MAMMOGRAM FOR BREAST CANCER: ICD-10-CM

## 2020-09-28 DIAGNOSIS — R93.1 AGATSTON CORONARY ARTERY CALCIUM SCORE GREATER THAN 400: ICD-10-CM

## 2020-09-28 DIAGNOSIS — Z00.00 ROUTINE GENERAL MEDICAL EXAMINATION AT A HEALTH CARE FACILITY: ICD-10-CM

## 2020-09-28 DIAGNOSIS — K76.0 NAFLD (NONALCOHOLIC FATTY LIVER DISEASE): ICD-10-CM

## 2020-09-28 PROCEDURE — 77067 SCR MAMMO BI INCL CAD: CPT | Performed by: INTERNAL MEDICINE

## 2020-09-28 PROCEDURE — 80061 LIPID PANEL: CPT

## 2020-09-28 PROCEDURE — 80053 COMPREHEN METABOLIC PANEL: CPT

## 2020-09-28 PROCEDURE — 77063 BREAST TOMOSYNTHESIS BI: CPT | Performed by: INTERNAL MEDICINE

## 2020-09-28 PROCEDURE — 76981 USE PARENCHYMA: CPT | Performed by: INTERNAL MEDICINE

## 2020-09-28 PROCEDURE — 76705 ECHO EXAM OF ABDOMEN: CPT | Performed by: INTERNAL MEDICINE

## 2020-09-28 PROCEDURE — 84443 ASSAY THYROID STIM HORMONE: CPT

## 2020-09-28 PROCEDURE — 36415 COLL VENOUS BLD VENIPUNCTURE: CPT

## 2020-09-29 ENCOUNTER — TELEPHONE (OUTPATIENT)
Dept: INTERNAL MEDICINE CLINIC | Facility: CLINIC | Age: 65
End: 2020-09-29

## 2020-09-29 DIAGNOSIS — Z00.00 ROUTINE GENERAL MEDICAL EXAMINATION AT A HEALTH CARE FACILITY: ICD-10-CM

## 2020-09-29 DIAGNOSIS — Z13.29 SCREENING FOR THYROID DISORDER: Primary | ICD-10-CM

## 2020-09-29 NOTE — TELEPHONE ENCOUNTER
Per PSR, lab calling to ask if TSH can be added on. Pt had blood drawn yesterday, ordered by cardiology Dr. Griselda Castanon. TSH   Date Value   09/27/2019 2.210 mIU/mL   12/19/2015 1.91 mIU/L         Pending for approval.  To use existing specimen.     To call

## 2020-09-29 NOTE — TELEPHONE ENCOUNTER
Pt is already scheduled for PE - a little earlier than 365 days, will ask PSR to follow up to be sure okay to do a little early. Labs placed. Spoke with Jonelle Loaiza in lab, they can add on tsh but not cbc.     PSR - please check that is okay for pt to have he

## 2020-09-29 NOTE — TELEPHONE ENCOUNTER
Not sure why she wants a tsh. She will be due for cpx in 11/2020. Please make her an appt for the cpx. Then order tsh and cbc as screening physical labs.

## 2020-10-07 ENCOUNTER — TELEPHONE (OUTPATIENT)
Dept: INTERNAL MEDICINE CLINIC | Facility: CLINIC | Age: 65
End: 2020-10-07

## 2020-10-07 ENCOUNTER — OFFICE VISIT (OUTPATIENT)
Dept: CARDIOLOGY | Age: 65
End: 2020-10-07

## 2020-10-07 VITALS
WEIGHT: 178 LBS | BODY MASS INDEX: 31.54 KG/M2 | SYSTOLIC BLOOD PRESSURE: 124 MMHG | HEIGHT: 63 IN | HEART RATE: 58 BPM | DIASTOLIC BLOOD PRESSURE: 82 MMHG

## 2020-10-07 DIAGNOSIS — R93.1 ELEVATED CORONARY ARTERY CALCIUM SCORE: Primary | ICD-10-CM

## 2020-10-07 DIAGNOSIS — R93.1 AGATSTON CORONARY ARTERY CALCIUM SCORE GREATER THAN 400: Primary | ICD-10-CM

## 2020-10-07 DIAGNOSIS — I10 ESSENTIAL HYPERTENSION: ICD-10-CM

## 2020-10-07 PROCEDURE — 3079F DIAST BP 80-89 MM HG: CPT | Performed by: INTERNAL MEDICINE

## 2020-10-07 PROCEDURE — 99214 OFFICE O/P EST MOD 30 MIN: CPT | Performed by: INTERNAL MEDICINE

## 2020-10-07 PROCEDURE — 3074F SYST BP LT 130 MM HG: CPT | Performed by: INTERNAL MEDICINE

## 2020-10-07 SDOH — HEALTH STABILITY: PHYSICAL HEALTH: ON AVERAGE, HOW MANY DAYS PER WEEK DO YOU ENGAGE IN MODERATE TO STRENUOUS EXERCISE (LIKE A BRISK WALK)?: 0 DAYS

## 2020-10-07 SDOH — HEALTH STABILITY: MENTAL HEALTH: HOW OFTEN DO YOU HAVE A DRINK CONTAINING ALCOHOL?: NEVER

## 2020-10-07 SDOH — HEALTH STABILITY: PHYSICAL HEALTH: ON AVERAGE, HOW MANY MINUTES DO YOU ENGAGE IN EXERCISE AT THIS LEVEL?: 0 MIN

## 2020-10-07 ASSESSMENT — PATIENT HEALTH QUESTIONNAIRE - PHQ9
CLINICAL INTERPRETATION OF PHQ9 SCORE: NO FURTHER SCREENING NEEDED
CLINICAL INTERPRETATION OF PHQ2 SCORE: NO FURTHER SCREENING NEEDED
SUM OF ALL RESPONSES TO PHQ9 QUESTIONS 1 AND 2: 0
SUM OF ALL RESPONSES TO PHQ9 QUESTIONS 1 AND 2: 0
1. LITTLE INTEREST OR PLEASURE IN DOING THINGS: NOT AT ALL
2. FEELING DOWN, DEPRESSED OR HOPELESS: NOT AT ALL

## 2020-10-07 ASSESSMENT — ENCOUNTER SYMPTOMS
HEMATOCHEZIA: 0
SUSPICIOUS LESIONS: 0
CHILLS: 0
WEIGHT GAIN: 0
WEIGHT LOSS: 0
COUGH: 0
FEVER: 0
ALLERGIC/IMMUNOLOGIC COMMENTS: NO NEW FOOD ALLERGIES
BRUISES/BLEEDS EASILY: 0
HEMOPTYSIS: 0

## 2020-10-07 NOTE — TELEPHONE ENCOUNTER
Pt calling. States at Dr. Jakob Garcia office for Randell Worley.   Needs renewed referral.  Noted last one ordered 2/27/2019    Is pt up to date on OVs with PCP?: yes  Has pt been seen/referred for condition?: yes  Is specialist in network?: yes  Health Maintenance up

## 2020-10-10 ENCOUNTER — LAB ENCOUNTER (OUTPATIENT)
Dept: LAB | Age: 65
End: 2020-10-10
Attending: INTERNAL MEDICINE
Payer: COMMERCIAL

## 2020-10-10 DIAGNOSIS — Z00.00 ROUTINE GENERAL MEDICAL EXAMINATION AT A HEALTH CARE FACILITY: ICD-10-CM

## 2020-10-10 PROCEDURE — 36415 COLL VENOUS BLD VENIPUNCTURE: CPT

## 2020-10-10 PROCEDURE — 85025 COMPLETE CBC W/AUTO DIFF WBC: CPT

## 2020-10-19 ENCOUNTER — OFFICE VISIT (OUTPATIENT)
Dept: SURGERY | Facility: CLINIC | Age: 65
End: 2020-10-19

## 2020-10-19 VITALS
HEART RATE: 59 BPM | DIASTOLIC BLOOD PRESSURE: 90 MMHG | WEIGHT: 177.81 LBS | HEIGHT: 61 IN | OXYGEN SATURATION: 98 % | RESPIRATION RATE: 16 BRPM | SYSTOLIC BLOOD PRESSURE: 148 MMHG | BODY MASS INDEX: 33.57 KG/M2

## 2020-10-19 DIAGNOSIS — K75.81 NASH (NONALCOHOLIC STEATOHEPATITIS): Primary | ICD-10-CM

## 2020-10-19 DIAGNOSIS — K74.00 LIVER FIBROSIS: ICD-10-CM

## 2020-10-20 NOTE — PROGRESS NOTES
[facility]  [address], [address]  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

## 2020-11-06 ENCOUNTER — OFFICE VISIT (OUTPATIENT)
Dept: INTERNAL MEDICINE CLINIC | Facility: CLINIC | Age: 65
End: 2020-11-06

## 2020-11-06 ENCOUNTER — TELEPHONE (OUTPATIENT)
Dept: INTERNAL MEDICINE CLINIC | Facility: CLINIC | Age: 65
End: 2020-11-06

## 2020-11-06 VITALS
SYSTOLIC BLOOD PRESSURE: 130 MMHG | BODY MASS INDEX: 32.62 KG/M2 | HEART RATE: 64 BPM | WEIGHT: 175 LBS | RESPIRATION RATE: 12 BRPM | DIASTOLIC BLOOD PRESSURE: 80 MMHG | HEIGHT: 61.5 IN | TEMPERATURE: 97 F

## 2020-11-06 DIAGNOSIS — I10 ESSENTIAL HYPERTENSION: ICD-10-CM

## 2020-11-06 DIAGNOSIS — Z78.0 POSTMENOPAUSAL: ICD-10-CM

## 2020-11-06 DIAGNOSIS — Z12.31 SCREENING MAMMOGRAM, ENCOUNTER FOR: ICD-10-CM

## 2020-11-06 DIAGNOSIS — Z00.00 PHYSICAL EXAM, ANNUAL: ICD-10-CM

## 2020-11-06 DIAGNOSIS — Z12.11 SCREENING FOR COLON CANCER: ICD-10-CM

## 2020-11-06 DIAGNOSIS — K76.0 FATTY LIVER: ICD-10-CM

## 2020-11-06 PROCEDURE — 3079F DIAST BP 80-89 MM HG: CPT | Performed by: INTERNAL MEDICINE

## 2020-11-06 PROCEDURE — 99397 PER PM REEVAL EST PAT 65+ YR: CPT | Performed by: INTERNAL MEDICINE

## 2020-11-06 PROCEDURE — 3075F SYST BP GE 130 - 139MM HG: CPT | Performed by: INTERNAL MEDICINE

## 2020-11-06 PROCEDURE — 3008F BODY MASS INDEX DOCD: CPT | Performed by: INTERNAL MEDICINE

## 2020-11-06 NOTE — PROGRESS NOTES
[address] [facility]    CHIEF COMPLAINT: Patient presents with:  Routine Physical: 12/25/19-FIT. 10/23/18-normal pap, neg HPV. 9/28/20-mammo.          HPI:   Alpesh Michael is a 76year old female who presents for a complete physical exam. Symptoms: den positional vertigo)    • Cholecystitis with cholelithiasis 3/27/2014   • Disorder of liver     elevated liver functions   • Dysuria    • Elevated alkaline phosphatase level 12/9/2013   • High blood pressure    • HTN (hypertension)    • Lung nodule 12/9/201 10/14/2010   BMI 32.53 kg/m²   Body mass index is 32.53 kg/m².    GENERAL: well developed, well nourished,in no apparent distress  SKIN: no rashes,no suspicious lesions  HEENT: atraumatic, normocephalic,ears and throat are clear  EYES:PERRLA, conjunctiva ar for next year. dexa ordered. cologuard ordered. She will think about shingrix. Continue regular exercise. Needs weight loss. 2. Screening mammogram, encounter for  - [facility] CRISTINA 2D+3D SCREENING BILAT (CPT=77067/19072); Future    3.  Screening for c

## 2020-11-25 ENCOUNTER — TELEPHONE (OUTPATIENT)
Dept: INTERNAL MEDICINE CLINIC | Facility: CLINIC | Age: 65
End: 2020-11-25

## 2020-11-25 ENCOUNTER — MED REC SCAN ONLY (OUTPATIENT)
Dept: INTERNAL MEDICINE CLINIC | Facility: CLINIC | Age: 65
End: 2020-11-25

## 2020-11-25 NOTE — TELEPHONE ENCOUNTER
11/16/20 negative cologuard result. To Dr Nyra Leventhal for review. External results updated,  updated. To notify pt of results.

## 2020-12-15 DIAGNOSIS — I10 ESSENTIAL HYPERTENSION: ICD-10-CM

## 2020-12-15 RX ORDER — METOPROLOL SUCCINATE 100 MG/1
TABLET, EXTENDED RELEASE ORAL
Qty: 90 TABLET | Refills: 1 | Status: SHIPPED | OUTPATIENT
Start: 2020-12-15 | End: 2021-05-10

## 2020-12-15 RX ORDER — LISINOPRIL 40 MG/1
TABLET ORAL
Qty: 90 TABLET | Refills: 1 | Status: SHIPPED | OUTPATIENT
Start: 2020-12-15 | End: 2021-05-10

## 2020-12-15 RX ORDER — TRIAMTERENE AND HYDROCHLOROTHIAZIDE 37.5; 25 MG/1; MG/1
TABLET ORAL
Qty: 45 TABLET | Refills: 1 | Status: SHIPPED | OUTPATIENT
Start: 2020-12-15 | End: 2021-05-10

## 2021-02-06 DIAGNOSIS — Z23 NEED FOR VACCINATION: ICD-10-CM

## 2021-02-07 ENCOUNTER — IMMUNIZATION (OUTPATIENT)
Dept: LAB | Age: 66
End: 2021-02-07
Attending: HOSPITALIST
Payer: COMMERCIAL

## 2021-02-07 DIAGNOSIS — Z23 NEED FOR VACCINATION: Primary | ICD-10-CM

## 2021-02-07 PROCEDURE — 0001A SARSCOV2 VAC 30MCG/0.3ML IM: CPT

## 2021-02-28 ENCOUNTER — IMMUNIZATION (OUTPATIENT)
Dept: LAB | Age: 66
End: 2021-02-28
Attending: HOSPITALIST
Payer: COMMERCIAL

## 2021-02-28 DIAGNOSIS — Z23 NEED FOR VACCINATION: Primary | ICD-10-CM

## 2021-02-28 PROCEDURE — 0002A SARSCOV2 VAC 30MCG/0.3ML IM: CPT

## 2021-03-08 ENCOUNTER — HOSPITAL ENCOUNTER (OUTPATIENT)
Dept: BONE DENSITY | Age: 66
Discharge: HOME | End: 2021-03-08
Attending: INTERNAL MEDICINE
Payer: COMMERCIAL

## 2021-03-08 DIAGNOSIS — Z78.0 POSTMENOPAUSAL: ICD-10-CM

## 2021-03-08 PROCEDURE — 77080 DXA BONE DENSITY AXIAL: CPT | Performed by: INTERNAL MEDICINE

## 2021-03-31 ENCOUNTER — PATIENT MESSAGE (OUTPATIENT)
Dept: INTERNAL MEDICINE CLINIC | Facility: CLINIC | Age: 66
End: 2021-03-31

## 2021-03-31 DIAGNOSIS — R73.9 BLOOD GLUCOSE ELEVATED: Primary | ICD-10-CM

## 2021-04-01 NOTE — TELEPHONE ENCOUNTER
I cant find anything in Dr. Morocho Proper notes that is asking her to monitor her sugar and send in readings. She has no hx of DM and is not on meds. Hold readings for Dr Kristin Jeffery and she can decide if she wants an a1c.

## 2021-04-01 NOTE — TELEPHONE ENCOUNTER
From: Derrill Cabot June Charl Ohm  To: Roxy Mulligan MD  Sent: 3/31/2021 7:08 PM CDT  Subject: Other    Bloos sugar readings for Kelli Moreno  3/21 6am 126  3/22 5am 128  3/23 5am 115  3/24 5am 128  3/25 5am 129  3/26 5am 120  3/27 7am 173 after meal  3/28 6am 116

## 2021-04-10 ENCOUNTER — LAB ENCOUNTER (OUTPATIENT)
Dept: LAB | Age: 66
End: 2021-04-10
Attending: INTERNAL MEDICINE
Payer: COMMERCIAL

## 2021-04-10 DIAGNOSIS — K76.0 FATTY LIVER: ICD-10-CM

## 2021-04-10 DIAGNOSIS — I10 ESSENTIAL HYPERTENSION: ICD-10-CM

## 2021-04-10 DIAGNOSIS — R73.9 BLOOD GLUCOSE ELEVATED: ICD-10-CM

## 2021-04-10 PROCEDURE — 36415 COLL VENOUS BLD VENIPUNCTURE: CPT

## 2021-04-10 PROCEDURE — 83036 HEMOGLOBIN GLYCOSYLATED A1C: CPT

## 2021-04-10 PROCEDURE — 80053 COMPREHEN METABOLIC PANEL: CPT

## 2021-04-12 NOTE — PROGRESS NOTES
Spoke to pt. Made aware of results & recommendations. Pt voiced understanding.   Rx and lab order sent

## 2021-04-24 ENCOUNTER — LAB ENCOUNTER (OUTPATIENT)
Dept: LAB | Age: 66
End: 2021-04-24
Attending: INTERNAL MEDICINE
Payer: COMMERCIAL

## 2021-04-24 DIAGNOSIS — E87.6 HYPOKALEMIA: ICD-10-CM

## 2021-04-24 PROCEDURE — 36415 COLL VENOUS BLD VENIPUNCTURE: CPT

## 2021-04-24 PROCEDURE — 84132 ASSAY OF SERUM POTASSIUM: CPT

## 2021-05-10 ENCOUNTER — OFFICE VISIT (OUTPATIENT)
Dept: INTERNAL MEDICINE CLINIC | Facility: CLINIC | Age: 66
End: 2021-05-10
Payer: MEDICARE

## 2021-05-10 VITALS
BODY MASS INDEX: 33.34 KG/M2 | TEMPERATURE: 97 F | WEIGHT: 178.88 LBS | SYSTOLIC BLOOD PRESSURE: 150 MMHG | DIASTOLIC BLOOD PRESSURE: 86 MMHG | RESPIRATION RATE: 12 BRPM | HEART RATE: 60 BPM | HEIGHT: 61.5 IN

## 2021-05-10 DIAGNOSIS — K76.0 FATTY LIVER: ICD-10-CM

## 2021-05-10 DIAGNOSIS — I77.9 BILATERAL CAROTID ARTERY DISEASE, UNSPECIFIED TYPE (HCC): ICD-10-CM

## 2021-05-10 DIAGNOSIS — M79.605 PAIN IN BOTH LOWER EXTREMITIES: ICD-10-CM

## 2021-05-10 DIAGNOSIS — M79.604 PAIN IN BOTH LOWER EXTREMITIES: ICD-10-CM

## 2021-05-10 DIAGNOSIS — I25.10 CORONARY ARTERY DISEASE INVOLVING NATIVE CORONARY ARTERY OF NATIVE HEART WITHOUT ANGINA PECTORIS: ICD-10-CM

## 2021-05-10 DIAGNOSIS — I10 ESSENTIAL HYPERTENSION: Primary | ICD-10-CM

## 2021-05-10 PROCEDURE — 99214 OFFICE O/P EST MOD 30 MIN: CPT | Performed by: INTERNAL MEDICINE

## 2021-05-10 PROCEDURE — 3079F DIAST BP 80-89 MM HG: CPT | Performed by: INTERNAL MEDICINE

## 2021-05-10 PROCEDURE — 3008F BODY MASS INDEX DOCD: CPT | Performed by: INTERNAL MEDICINE

## 2021-05-10 PROCEDURE — 3077F SYST BP >= 140 MM HG: CPT | Performed by: INTERNAL MEDICINE

## 2021-05-10 RX ORDER — LISINOPRIL 40 MG/1
40 TABLET ORAL DAILY
Qty: 90 TABLET | Refills: 1 | Status: SHIPPED | OUTPATIENT
Start: 2021-05-10

## 2021-05-10 RX ORDER — METOPROLOL SUCCINATE 100 MG/1
100 TABLET, EXTENDED RELEASE ORAL DAILY
Qty: 90 TABLET | Refills: 1 | Status: SHIPPED | OUTPATIENT
Start: 2021-05-10 | End: 2021-09-09

## 2021-05-10 RX ORDER — TRIAMTERENE AND HYDROCHLOROTHIAZIDE 37.5; 25 MG/1; MG/1
1 TABLET ORAL DAILY
Qty: 90 TABLET | Refills: 0 | Status: SHIPPED | OUTPATIENT
Start: 2021-05-10 | End: 2021-08-30

## 2021-05-10 NOTE — PROGRESS NOTES
[address] [facility]    CHIEF COMPLAINT:  Patient presents with:  Medication Follow-Up: 11/16/20-cologuard. 9/28/20-mammo. 10/23/18-pap        HISTORY OF PRESENT ILLNESS:  Here for med check. Htn: Taking meds regularly.  No chest pain, no shortness of ena mild swelling in both ankles. No pitting edema. Has varicose veins. Pulses normal in both feet.      DATA:  Results for orders placed or performed in visit on 04/24/21   POTASSIUM   Result Value Ref Range    Potassium 3.8 3.5 - 5.1 mmol/L      Lab Results

## 2021-06-14 ENCOUNTER — OFFICE VISIT (OUTPATIENT)
Dept: INTERNAL MEDICINE CLINIC | Facility: CLINIC | Age: 66
End: 2021-06-14
Payer: MEDICARE

## 2021-06-14 VITALS
SYSTOLIC BLOOD PRESSURE: 132 MMHG | TEMPERATURE: 98 F | HEART RATE: 64 BPM | DIASTOLIC BLOOD PRESSURE: 80 MMHG | BODY MASS INDEX: 33.18 KG/M2 | HEIGHT: 61.5 IN | RESPIRATION RATE: 12 BRPM | WEIGHT: 178 LBS

## 2021-06-14 DIAGNOSIS — K76.0 FATTY LIVER: ICD-10-CM

## 2021-06-14 DIAGNOSIS — I10 ESSENTIAL HYPERTENSION: Primary | ICD-10-CM

## 2021-06-14 PROCEDURE — 3079F DIAST BP 80-89 MM HG: CPT | Performed by: INTERNAL MEDICINE

## 2021-06-14 PROCEDURE — 99214 OFFICE O/P EST MOD 30 MIN: CPT | Performed by: INTERNAL MEDICINE

## 2021-06-14 PROCEDURE — 3075F SYST BP GE 130 - 139MM HG: CPT | Performed by: INTERNAL MEDICINE

## 2021-06-14 PROCEDURE — 3008F BODY MASS INDEX DOCD: CPT | Performed by: INTERNAL MEDICINE

## 2021-06-14 NOTE — PROGRESS NOTES
[facility]    CHIEF COMPLAINT:  Patient presents with:  Blood Pressure: 10/23/18-pap. 9/28/20-mammo. 11/16/20-Cologuard        HISTORY OF PRESENT ILLNESS:  Here for follow up. Htn: Taking meds regularly. No chest pain, no shortness of breath.  Cachorro Suazo monitor bp at home. Low salt diet. Urged regular exercise. 2. Fatty liver  Will monitor. Urged exercise and weight loss. follow up with dr. Staci Angel as planned. Return for physical when due.  .  If due for supervisit based on insurance sarahi wright

## 2021-08-23 ENCOUNTER — TELEPHONE (OUTPATIENT)
Dept: CARDIOLOGY | Age: 66
End: 2021-08-23

## 2021-08-29 DIAGNOSIS — I10 ESSENTIAL HYPERTENSION: ICD-10-CM

## 2021-08-30 RX ORDER — TRIAMTERENE AND HYDROCHLOROTHIAZIDE 37.5; 25 MG/1; MG/1
1 TABLET ORAL DAILY
Qty: 90 TABLET | Refills: 0 | Status: SHIPPED | OUTPATIENT
Start: 2021-08-30 | End: 2021-11-27

## 2021-09-09 DIAGNOSIS — I10 ESSENTIAL HYPERTENSION: ICD-10-CM

## 2021-09-09 RX ORDER — METOPROLOL SUCCINATE 100 MG/1
TABLET, EXTENDED RELEASE ORAL
Qty: 90 TABLET | Refills: 0 | Status: SHIPPED | OUTPATIENT
Start: 2021-09-09

## 2021-09-22 ENCOUNTER — TELEPHONE (OUTPATIENT)
Dept: INTERNAL MEDICINE CLINIC | Facility: CLINIC | Age: 66
End: 2021-09-22

## 2021-09-22 ENCOUNTER — LAB ENCOUNTER (OUTPATIENT)
Dept: LAB | Age: 66
End: 2021-09-22
Attending: INTERNAL MEDICINE
Payer: MEDICARE

## 2021-09-22 DIAGNOSIS — K74.00 LIVER FIBROSIS: ICD-10-CM

## 2021-09-22 DIAGNOSIS — K75.81 NASH (NONALCOHOLIC STEATOHEPATITIS): ICD-10-CM

## 2021-09-22 LAB
ALBUMIN SERPL-MCNC: 3.9 G/DL (ref 3.4–5)
ALBUMIN/GLOB SERPL: 0.9 {RATIO} (ref 1–2)
ALP LIVER SERPL-CCNC: 81 U/L
ALT SERPL-CCNC: 55 U/L
ANION GAP SERPL CALC-SCNC: 4 MMOL/L (ref 0–18)
AST SERPL-CCNC: 37 U/L (ref 15–37)
BILIRUB SERPL-MCNC: 0.8 MG/DL (ref 0.1–2)
BUN BLD-MCNC: 16 MG/DL (ref 7–18)
CALCIUM BLD-MCNC: 9.4 MG/DL (ref 8.5–10.1)
CHLORIDE SERPL-SCNC: 104 MMOL/L (ref 98–112)
CO2 SERPL-SCNC: 31 MMOL/L (ref 21–32)
CREAT BLD-MCNC: 0.8 MG/DL
GLOBULIN PLAS-MCNC: 4.3 G/DL (ref 2.8–4.4)
GLUCOSE BLD-MCNC: 117 MG/DL (ref 70–99)
INR BLD: 1 (ref 0.89–1.11)
OSMOLALITY SERPL CALC.SUM OF ELEC: 290 MOSM/KG (ref 275–295)
PATIENT FASTING Y/N/NP: YES
POTASSIUM SERPL-SCNC: 3.6 MMOL/L (ref 3.5–5.1)
PROT SERPL-MCNC: 8.2 G/DL (ref 6.4–8.2)
PROTHROMBIN TIME: 13.4 SECONDS (ref 12.2–14.5)
SODIUM SERPL-SCNC: 139 MMOL/L (ref 136–145)

## 2021-09-22 PROCEDURE — 85610 PROTHROMBIN TIME: CPT

## 2021-09-22 PROCEDURE — 36415 COLL VENOUS BLD VENIPUNCTURE: CPT

## 2021-09-22 PROCEDURE — 80053 COMPREHEN METABOLIC PANEL: CPT

## 2021-09-22 NOTE — TELEPHONE ENCOUNTER
Incoming (mail or fax):  fax  Received from:  [facility]   Documentation given to:  [facility] rec bin

## 2021-09-24 ENCOUNTER — PATIENT MESSAGE (OUTPATIENT)
Dept: INTERNAL MEDICINE CLINIC | Facility: CLINIC | Age: 66
End: 2021-09-24

## 2021-09-24 DIAGNOSIS — K75.81 NASH (NONALCOHOLIC STEATOHEPATITIS): ICD-10-CM

## 2021-09-24 DIAGNOSIS — K74.00 LIVER FIBROSIS: ICD-10-CM

## 2021-09-24 DIAGNOSIS — K76.0 FATTY LIVER: Primary | ICD-10-CM

## 2021-09-24 DIAGNOSIS — R74.8 ELEVATED LIVER ENZYMES: ICD-10-CM

## 2021-09-27 NOTE — TELEPHONE ENCOUNTER
Is pt up to date on OVs with PCP?: yes  Has pt been seen/referred for condition?: yes  Is specialist in network?: yes  Health Maintenance up to date?: yes         If no, reminder given?: n/a

## 2021-09-27 NOTE — TELEPHONE ENCOUNTER
From: Alanna Brock Davina William Forte  To: Gauri Terrell MD  Sent: 9/24/2021 6:25 PM CDT  Subject: Referral for Dr Magalie Marshall    I think I need a new referral for Dr. Magalie Marshall for my 10/18/21 appt.   Thank you,  Pavel Byers

## 2021-09-27 NOTE — TELEPHONE ENCOUNTER
[facility] present. Forwarded to [employer] 9-28-21.   See med rec scan dated 9-27-21

## 2021-09-30 ENCOUNTER — HOSPITAL ENCOUNTER (OUTPATIENT)
Dept: MAMMOGRAPHY | Age: 66
Discharge: HOME | End: 2021-09-30
Attending: INTERNAL MEDICINE
Payer: COMMERCIAL

## 2021-09-30 DIAGNOSIS — Z12.31 SCREENING MAMMOGRAM, ENCOUNTER FOR: ICD-10-CM

## 2021-09-30 PROCEDURE — 77063 BREAST TOMOSYNTHESIS BI: CPT | Performed by: INTERNAL MEDICINE

## 2021-09-30 PROCEDURE — 77067 SCR MAMMO BI INCL CAD: CPT | Performed by: INTERNAL MEDICINE

## 2021-10-09 ENCOUNTER — IMMUNIZATION (OUTPATIENT)
Dept: LAB | Facility: HOSPITAL | Age: 66
End: 2021-10-09
Attending: EMERGENCY MEDICINE
Payer: MEDICARE

## 2021-10-09 DIAGNOSIS — Z23 NEED FOR VACCINATION: Primary | ICD-10-CM

## 2021-10-09 PROCEDURE — 0003A SARSCOV2 VAC 30MCG/0.3ML IM: CPT

## 2021-10-13 ENCOUNTER — APPOINTMENT (OUTPATIENT)
Dept: CARDIOLOGY | Age: 66
End: 2021-10-13

## 2021-10-18 ENCOUNTER — OFFICE VISIT (OUTPATIENT)
Dept: SURGERY | Facility: CLINIC | Age: 66
End: 2021-10-18
Payer: MEDICARE

## 2021-10-18 VITALS
WEIGHT: 174 LBS | RESPIRATION RATE: 16 BRPM | BODY MASS INDEX: 32 KG/M2 | DIASTOLIC BLOOD PRESSURE: 86 MMHG | HEART RATE: 54 BPM | SYSTOLIC BLOOD PRESSURE: 126 MMHG | OXYGEN SATURATION: 96 %

## 2021-10-18 DIAGNOSIS — K75.81 NASH (NONALCOHOLIC STEATOHEPATITIS): Primary | ICD-10-CM

## 2021-10-18 RX ORDER — VITAMIN E 268 MG
800 CAPSULE ORAL DAILY
COMMUNITY
Start: 2021-10-18

## 2021-10-18 NOTE — PROGRESS NOTES
[facility]  [address], [address]  [address]  [phone] ([phone]) Outpatient Medications   Medication Sig Dispense Refill   • METOPROLOL SUCCINATE 100 MG Oral Tablet 24 Hr TAKE 1 TABLET(100 MG) BY MOUTH DAILY 90 tablet 0   • TRIAMTERENE-HCTZ 37.5-25 MG Oral Tab TAKE 1 TABLET BY MOUTH DAILY 90 tablet 0   • lisinopril [address], [address]th floor, [place], [place],  [address] (office)  [phone] (fax)  [phone] (mobile)  [email or web address]

## 2021-10-30 ENCOUNTER — LAB ENCOUNTER (OUTPATIENT)
Dept: LAB | Facility: HOSPITAL | Age: 66
End: 2021-10-30
Attending: INTERNAL MEDICINE
Payer: COMMERCIAL

## 2021-10-30 DIAGNOSIS — I10 ESSENTIAL HYPERTENSION, MALIGNANT: Primary | ICD-10-CM

## 2021-10-30 PROCEDURE — 80061 LIPID PANEL: CPT

## 2021-10-30 PROCEDURE — 36415 COLL VENOUS BLD VENIPUNCTURE: CPT

## 2021-10-30 PROCEDURE — 80053 COMPREHEN METABOLIC PANEL: CPT

## 2021-11-27 DIAGNOSIS — I10 ESSENTIAL HYPERTENSION: ICD-10-CM

## 2021-11-27 RX ORDER — TRIAMTERENE AND HYDROCHLOROTHIAZIDE 37.5; 25 MG/1; MG/1
1 TABLET ORAL DAILY
Qty: 90 TABLET | Refills: 0 | Status: SHIPPED | OUTPATIENT
Start: 2021-11-27

## 2021-12-08 ENCOUNTER — OFFICE VISIT (OUTPATIENT)
Dept: INTERNAL MEDICINE CLINIC | Facility: CLINIC | Age: 66
End: 2021-12-08
Payer: MEDICARE

## 2021-12-08 VITALS
HEART RATE: 87 BPM | RESPIRATION RATE: 16 BRPM | DIASTOLIC BLOOD PRESSURE: 64 MMHG | OXYGEN SATURATION: 98 % | BODY MASS INDEX: 31.99 KG/M2 | SYSTOLIC BLOOD PRESSURE: 118 MMHG | WEIGHT: 171.63 LBS | TEMPERATURE: 97 F | HEIGHT: 61.26 IN

## 2021-12-08 DIAGNOSIS — Z00.00 PHYSICAL EXAM, ANNUAL: ICD-10-CM

## 2021-12-08 DIAGNOSIS — Z12.31 SCREENING MAMMOGRAM FOR BREAST CANCER: Primary | ICD-10-CM

## 2021-12-08 PROCEDURE — 3074F SYST BP LT 130 MM HG: CPT | Performed by: INTERNAL MEDICINE

## 2021-12-08 PROCEDURE — 3078F DIAST BP <80 MM HG: CPT | Performed by: INTERNAL MEDICINE

## 2021-12-08 PROCEDURE — 3008F BODY MASS INDEX DOCD: CPT | Performed by: INTERNAL MEDICINE

## 2021-12-08 PROCEDURE — 99397 PER PM REEVAL EST PAT 65+ YR: CPT | Performed by: INTERNAL MEDICINE

## 2021-12-08 NOTE — PROGRESS NOTES
[facility] AND [facility]    CHIEF COMPLAINT: Patient presents with:  Physical: PAP: 2018 Due: 2023, ABDULKADIR: 09/2021, Colon: 10/2016 - refuses anymore - Cologuard - 11/2020 = Negative, Dexa: 03/2021        HPI:   Angel Cesar is a 78year old female who pres vertigo)    • Cholecystitis with cholelithiasis 3/27/2014   • Disorder of liver     elevated liver functions   • Dysuria    • Elevated alkaline phosphatase level 12/9/2013   • High blood pressure    • HTN (hypertension)    • Lung nodule 12/9/2013   • Maggie Brochure denies hx of anemia  ENDOCRINE: denies thyroid history  ALL/ASTHMA: denies hx of allergy or asthma    EXAM:   /64 (BP Location: Left arm, Patient Position: Sitting, Cuff Size: large)   Pulse 87   Temp 97.3 °F (36.3 °C) (Temporal)   Resp 16   Ht 5' 1. 10/30/2021 07:06 AM    TP 7.9 10/30/2021 07:06 AM    ALKPHO 78 10/30/2021 07:06 AM    AST 35 10/30/2021 07:06 AM    ALT 52 10/30/2021 07:06 AM    BILT 0.8 10/30/2021 07:06 AM    TSH 1.460 09/28/2020 08:14 AM        Lab Results   Component Value Date/Time

## 2022-02-07 ENCOUNTER — PATIENT MESSAGE (OUTPATIENT)
Dept: INTERNAL MEDICINE CLINIC | Facility: CLINIC | Age: 67
End: 2022-02-07

## 2022-02-07 NOTE — TELEPHONE ENCOUNTER
Called and spoke with the patient, she stated she feel as is she lifted heavy on Saturday and woke up in pain on Sunday. Patient complained of bilateral lower back pain with a shooting pain down her right leg. Patient stated her pain has decreased. Since she has been taking tylenol, resting and applying heat. Denies any pain on the left lower back  Right lower back pain is at a 3/10 and denies any further shooting pain  Denies tingling or weakness at this time. Advised appt. Patient scheduled for 0740 tomorrow morning with Neto Plata 02/08/2022.

## 2022-02-07 NOTE — TELEPHONE ENCOUNTER
From: Guzman Sames June Aleciaia Juan Miguelashleyreyna  To: Alfredo King MD  Sent: 2/7/2022 6:36 AM CST  Subject: Chiropractor visit    Morning Dr. Derrek Richards,  Sunday morning woke to find I threwout my lower back, probably lifting a case of water. Unaware of any pain Saturday nite. Using heating pad and resting Sunday, using Tylenol- no difference. This morning Monday, back is still the same, a mild pain starting to go down right leg. Do you recommend a Chiropractor? I have to go to work, pain about an 8.   Thank you,  Asa Mendoza

## 2022-02-08 ENCOUNTER — OFFICE VISIT (OUTPATIENT)
Dept: INTERNAL MEDICINE CLINIC | Facility: CLINIC | Age: 67
End: 2022-02-08
Payer: MEDICARE

## 2022-02-08 VITALS
OXYGEN SATURATION: 98 % | WEIGHT: 176.19 LBS | RESPIRATION RATE: 14 BRPM | HEIGHT: 61.26 IN | TEMPERATURE: 96 F | BODY MASS INDEX: 32.84 KG/M2 | HEART RATE: 62 BPM | SYSTOLIC BLOOD PRESSURE: 120 MMHG | DIASTOLIC BLOOD PRESSURE: 72 MMHG

## 2022-02-08 DIAGNOSIS — M54.50 ACUTE RIGHT-SIDED LOW BACK PAIN WITHOUT SCIATICA: Primary | ICD-10-CM

## 2022-02-08 DIAGNOSIS — I10 ESSENTIAL HYPERTENSION: ICD-10-CM

## 2022-02-08 DIAGNOSIS — K76.0 FATTY LIVER: ICD-10-CM

## 2022-02-08 PROCEDURE — 99214 OFFICE O/P EST MOD 30 MIN: CPT | Performed by: NURSE PRACTITIONER

## 2022-02-08 PROCEDURE — 3074F SYST BP LT 130 MM HG: CPT | Performed by: NURSE PRACTITIONER

## 2022-02-08 PROCEDURE — 3008F BODY MASS INDEX DOCD: CPT | Performed by: NURSE PRACTITIONER

## 2022-02-08 PROCEDURE — 3078F DIAST BP <80 MM HG: CPT | Performed by: NURSE PRACTITIONER

## 2022-02-11 ENCOUNTER — LAB ENCOUNTER (OUTPATIENT)
Dept: LAB | Age: 67
End: 2022-02-11
Attending: INTERNAL MEDICINE
Payer: MEDICARE

## 2022-02-11 DIAGNOSIS — Z00.00 PHYSICAL EXAM, ANNUAL: ICD-10-CM

## 2022-02-11 LAB
BASOPHILS # BLD AUTO: 0.03 X10(3) UL (ref 0–0.2)
BASOPHILS NFR BLD AUTO: 0.8 %
EOSINOPHIL # BLD AUTO: 0.12 X10(3) UL (ref 0–0.7)
EOSINOPHIL NFR BLD AUTO: 3 %
ERYTHROCYTE [DISTWIDTH] IN BLOOD BY AUTOMATED COUNT: 12 %
HCT VFR BLD AUTO: 44.9 %
HGB BLD-MCNC: 14.7 G/DL
IMM GRANULOCYTES # BLD AUTO: 0.01 X10(3) UL (ref 0–1)
IMM GRANULOCYTES NFR BLD: 0.3 %
LYMPHOCYTES # BLD AUTO: 0.9 X10(3) UL (ref 1–4)
LYMPHOCYTES NFR BLD AUTO: 22.7 %
MCH RBC QN AUTO: 31.2 PG (ref 26–34)
MCHC RBC AUTO-ENTMCNC: 32.7 G/DL (ref 31–37)
MCV RBC AUTO: 95.3 FL
MONOCYTES # BLD AUTO: 0.52 X10(3) UL (ref 0.1–1)
MONOCYTES NFR BLD AUTO: 13.1 %
NEUTROPHILS # BLD AUTO: 2.38 X10 (3) UL (ref 1.5–7.7)
NEUTROPHILS # BLD AUTO: 2.38 X10(3) UL (ref 1.5–7.7)
NEUTROPHILS NFR BLD AUTO: 60.1 %
PLATELET # BLD AUTO: 172 10(3)UL (ref 150–450)
RBC # BLD AUTO: 4.71 X10(6)UL
TSI SER-ACNC: 2 MIU/ML (ref 0.36–3.74)
WBC # BLD AUTO: 4 X10(3) UL (ref 4–11)

## 2022-02-11 PROCEDURE — 84443 ASSAY THYROID STIM HORMONE: CPT

## 2022-02-11 PROCEDURE — 36415 COLL VENOUS BLD VENIPUNCTURE: CPT

## 2022-02-11 PROCEDURE — 85025 COMPLETE CBC W/AUTO DIFF WBC: CPT

## 2022-02-15 ENCOUNTER — TELEPHONE (OUTPATIENT)
Dept: INTERNAL MEDICINE CLINIC | Facility: CLINIC | Age: 67
End: 2022-02-15

## 2022-02-16 NOTE — TELEPHONE ENCOUNTER
Request received form Dr Cm Baumann (hepatology) office. Pts next appt is 10/3/22.     Is pt up to date on OVs with PCP?: yes  Has pt been seen/referred for condition?: yes  Is specialist in network?: yes  Health Maintenance up to date?: yes         If no, reminder given?: n/a

## 2022-02-21 RX ORDER — LISINOPRIL 40 MG/1
TABLET ORAL
Qty: 90 TABLET | Refills: 0 | Status: SHIPPED | OUTPATIENT
Start: 2022-02-21

## 2022-02-21 NOTE — TELEPHONE ENCOUNTER
Last OV pertinent to medication: 12/8/21  Last refill date:  5/10/21          #/refills: 90 w/ 1  When pt was asked to return for OV: 6 months  Upcoming appt/reason: 4/18/2022 - med check  Recent Results (from the past 4380 hour(s))   COMP METABOLIC PANEL (14)    Collection Time: 10/30/21  7:06 AM   Result Value Ref Range    Glucose 109 (H) 70 - 99 mg/dL    Sodium 137 136 - 145 mmol/L    Potassium 3.5 3.5 - 5.1 mmol/L    Chloride 101 98 - 112 mmol/L    CO2 31.0 21.0 - 32.0 mmol/L    Anion Gap 5 0 - 18 mmol/L    BUN 19 (H) 7 - 18 mg/dL    Creatinine 0.86 0.55 - 1.02 mg/dL    Calcium, Total 9.2 8.5 - 10.1 mg/dL    Calculated Osmolality 287 275 - 295 mOsm/kg    GFR, Non- 70 >=60    GFR, -American 81 >=60    AST 35 15 - 37 U/L    ALT 52 13 - 56 U/L    Alkaline Phosphatase 78 55 - 142 U/L    Bilirubin, Total 0.8 0.1 - 2.0 mg/dL    Total Protein 7.9 6.4 - 8.2 g/dL    Albumin 3.6 3.4 - 5.0 g/dL    Globulin  4.3 2.8 - 4.4 g/dL    A/G Ratio 0.8 (L) 1.0 - 2.0    FASTING Yes      *Note: Due to a large number of results and/or encounters for the requested time period, some results have not been displayed. A complete set of results can be found in Results Review.

## 2022-03-02 RX ORDER — TRIAMTERENE AND HYDROCHLOROTHIAZIDE 37.5; 25 MG/1; MG/1
1 TABLET ORAL DAILY
Qty: 90 TABLET | Refills: 0 | Status: SHIPPED | OUTPATIENT
Start: 2022-03-02

## 2022-03-02 RX ORDER — METOPROLOL SUCCINATE 100 MG/1
TABLET, EXTENDED RELEASE ORAL
Qty: 90 TABLET | Refills: 0 | Status: SHIPPED | OUTPATIENT
Start: 2022-03-02

## 2022-03-31 ENCOUNTER — TELEPHONE (OUTPATIENT)
Dept: INTERNAL MEDICINE CLINIC | Facility: CLINIC | Age: 67
End: 2022-03-31

## 2022-03-31 DIAGNOSIS — M54.50 ACUTE RIGHT-SIDED LOW BACK PAIN WITHOUT SCIATICA: Primary | ICD-10-CM

## 2022-03-31 NOTE — TELEPHONE ENCOUNTER
Provider's Name?:  Marilyn Lima was not given a specific provider told to go to [facility]  Provider's Specialty?:    Reason for Visit?:  Physcial therapy   Diagnosis?:    Number of Visits Requested?:  3  Last Visit with Specialist?:  No  Is Appt.  Already Scheduled?:  No       If so, Date?:  N/a   Does pt need call back?: Yes  The number below is for [employer] so that the provider may get the authorization for PT  #[phone]

## 2022-03-31 NOTE — TELEPHONE ENCOUNTER
Spoke w/pt   Confirmed pt Is going to [facility] physical therapy [place] location   [address] [place] [address]   Faxed referral and dr. Chu Christina latest ov note from 2/82022 to [facility] electronically   [facility]  Fax: [phone]

## 2022-03-31 NOTE — TELEPHONE ENCOUNTER
Monitoring for authorization, notified the pt the referral is out of network and pt stated [facility] stated [facility] is in network but referral dept is saying otherwise

## 2022-04-04 NOTE — TELEPHONE ENCOUNTER
Spoke w/Ty at [facility] stated pt had her first visit today and has scheduled future visits

## 2022-04-05 ENCOUNTER — TELEPHONE (OUTPATIENT)
Dept: INTERNAL MEDICINE CLINIC | Facility: CLINIC | Age: 67
End: 2022-04-05

## 2022-04-05 NOTE — TELEPHONE ENCOUNTER
Incoming (mail or fax):  fax  Received from:  [facility]  Documentation given to:  Triage incoming bin    Physical Therapy initial evaluation/plan of care dated 4/4/22

## 2022-04-09 ENCOUNTER — IMMUNIZATION (OUTPATIENT)
Dept: LAB | Age: 67
End: 2022-04-09
Attending: EMERGENCY MEDICINE
Payer: MEDICARE

## 2022-04-09 DIAGNOSIS — Z23 NEED FOR VACCINATION: Primary | ICD-10-CM

## 2022-04-09 PROCEDURE — 0054A SARSCOV2 VAC 30MCG TRS SUCR: CPT

## 2022-04-18 ENCOUNTER — OFFICE VISIT (OUTPATIENT)
Dept: INTERNAL MEDICINE CLINIC | Facility: CLINIC | Age: 67
End: 2022-04-18
Payer: MEDICARE

## 2022-04-18 VITALS
TEMPERATURE: 97 F | HEART RATE: 64 BPM | WEIGHT: 175.19 LBS | RESPIRATION RATE: 12 BRPM | DIASTOLIC BLOOD PRESSURE: 78 MMHG | HEIGHT: 61.26 IN | BODY MASS INDEX: 32.65 KG/M2 | SYSTOLIC BLOOD PRESSURE: 128 MMHG

## 2022-04-18 DIAGNOSIS — G89.29 CHRONIC RIGHT SHOULDER PAIN: ICD-10-CM

## 2022-04-18 DIAGNOSIS — I77.9 BILATERAL CAROTID ARTERY DISEASE, UNSPECIFIED TYPE (HCC): ICD-10-CM

## 2022-04-18 DIAGNOSIS — K76.0 FATTY LIVER: ICD-10-CM

## 2022-04-18 DIAGNOSIS — E66.09 CLASS 1 OBESITY DUE TO EXCESS CALORIES WITH SERIOUS COMORBIDITY AND BODY MASS INDEX (BMI) OF 32.0 TO 32.9 IN ADULT: ICD-10-CM

## 2022-04-18 DIAGNOSIS — R73.01 IMPAIRED FASTING GLUCOSE: ICD-10-CM

## 2022-04-18 DIAGNOSIS — Z12.31 SCREENING MAMMOGRAM, ENCOUNTER FOR: ICD-10-CM

## 2022-04-18 DIAGNOSIS — M25.511 CHRONIC RIGHT SHOULDER PAIN: ICD-10-CM

## 2022-04-18 DIAGNOSIS — I25.10 CORONARY ARTERY DISEASE INVOLVING NATIVE CORONARY ARTERY OF NATIVE HEART WITHOUT ANGINA PECTORIS: ICD-10-CM

## 2022-04-18 DIAGNOSIS — R91.1 LUNG NODULE: ICD-10-CM

## 2022-04-18 DIAGNOSIS — I10 ESSENTIAL HYPERTENSION: ICD-10-CM

## 2022-04-18 DIAGNOSIS — Z00.00 ENCOUNTER FOR ANNUAL HEALTH EXAMINATION: ICD-10-CM

## 2022-04-18 PROCEDURE — 96160 PT-FOCUSED HLTH RISK ASSMT: CPT | Performed by: INTERNAL MEDICINE

## 2022-04-18 PROCEDURE — G0439 PPPS, SUBSEQ VISIT: HCPCS | Performed by: INTERNAL MEDICINE

## 2022-04-18 PROCEDURE — 3074F SYST BP LT 130 MM HG: CPT | Performed by: INTERNAL MEDICINE

## 2022-04-18 PROCEDURE — 3008F BODY MASS INDEX DOCD: CPT | Performed by: INTERNAL MEDICINE

## 2022-04-18 PROCEDURE — 99397 PER PM REEVAL EST PAT 65+ YR: CPT | Performed by: INTERNAL MEDICINE

## 2022-04-18 PROCEDURE — 3078F DIAST BP <80 MM HG: CPT | Performed by: INTERNAL MEDICINE

## 2022-04-21 ENCOUNTER — TELEPHONE (OUTPATIENT)
Dept: INTERNAL MEDICINE CLINIC | Facility: CLINIC | Age: 67
End: 2022-04-21

## 2022-04-21 NOTE — TELEPHONE ENCOUNTER
Incoming (mail or fax):  fax  Received from:  [facility]  Documentation given to:  Triage incoming bin    Progress note - needs signature

## 2022-04-26 ENCOUNTER — MED REC SCAN ONLY (OUTPATIENT)
Dept: INTERNAL MEDICINE CLINIC | Facility: CLINIC | Age: 67
End: 2022-04-26

## 2022-05-03 ENCOUNTER — MED REC SCAN ONLY (OUTPATIENT)
Dept: INTERNAL MEDICINE CLINIC | Facility: CLINIC | Age: 67
End: 2022-05-03

## 2022-05-12 ENCOUNTER — TELEPHONE (OUTPATIENT)
Dept: INTERNAL MEDICINE CLINIC | Facility: CLINIC | Age: 67
End: 2022-05-12

## 2022-05-12 NOTE — TELEPHONE ENCOUNTER
Incoming (mail or fax):  fax  Received from:  [facility]   Documentation given to:  Triage in basket     Needs to be signed and returned

## 2022-05-19 ENCOUNTER — TELEPHONE (OUTPATIENT)
Dept: INTERNAL MEDICINE CLINIC | Facility: CLINIC | Age: 67
End: 2022-05-19

## 2022-05-23 DIAGNOSIS — I10 ESSENTIAL HYPERTENSION: ICD-10-CM

## 2022-05-24 RX ORDER — METOPROLOL SUCCINATE 100 MG/1
TABLET, EXTENDED RELEASE ORAL
Qty: 90 TABLET | Refills: 1 | Status: SHIPPED | OUTPATIENT
Start: 2022-05-24

## 2022-05-24 RX ORDER — LISINOPRIL 40 MG/1
TABLET ORAL
Qty: 90 TABLET | Refills: 1 | Status: SHIPPED | OUTPATIENT
Start: 2022-05-24

## 2022-05-25 DIAGNOSIS — I10 ESSENTIAL HYPERTENSION: ICD-10-CM

## 2022-05-25 RX ORDER — TRIAMTERENE AND HYDROCHLOROTHIAZIDE 37.5; 25 MG/1; MG/1
1 TABLET ORAL DAILY
Qty: 90 TABLET | Refills: 1 | Status: SHIPPED | OUTPATIENT
Start: 2022-05-25

## 2022-05-26 ENCOUNTER — PATIENT MESSAGE (OUTPATIENT)
Dept: INTERNAL MEDICINE CLINIC | Facility: CLINIC | Age: 67
End: 2022-05-26

## 2022-05-27 DIAGNOSIS — I10 ESSENTIAL HYPERTENSION: ICD-10-CM

## 2022-05-27 NOTE — TELEPHONE ENCOUNTER
[employer] message with MD recommendation sent to pt.      Future Appointments   Date Time Provider Gisela Queta   9/26/2022  7:00 AM NN LABTECHS NN LAB EDW [place]   9/27/2022  7:30 AM WDR US RM1 WDR US EDW [place]   10/7/2022  1:40 PM LMB ABDULKADIR RM1 LMB ABDULKADIR EM [place]   10/18/2022  7:20 AM Clare Pickens MD EMG 29 EMG N [place]

## 2022-05-27 NOTE — TELEPHONE ENCOUNTER
From: Donnette Dupre June Marylen Craze  To: Clari Aguiar MD  Sent: 2022 6:59 PM CDT  Subject: Dr. Jayjay Garces is leaving     With Dr Jayjay Garces leaving [facility] do I need to continue to see a hepatologist? If so, please advise.      Thank you  Lesley Sanchez   10/10/1955

## 2022-05-27 NOTE — TELEPHONE ENCOUNTER
Yes, should continue to see hepatology. She should call dr. Kristina Medina office to see if they are directing their patients to any one in particular. If not can try dr. Eual Primrose at [facility].

## 2022-05-31 RX ORDER — TRIAMTERENE AND HYDROCHLOROTHIAZIDE 37.5; 25 MG/1; MG/1
1 TABLET ORAL DAILY
Qty: 270 TABLET | Refills: 0 | OUTPATIENT
Start: 2022-05-31

## 2022-05-31 NOTE — TELEPHONE ENCOUNTER
Hypertension Medications Protocol Passed 05/27/2022 10:48 AM   Protocol Details  CMP or BMP in past 12 months    Last serum creatinine< 2.0    Appointment in past 6 or next 3 months     Filled on 5/25/22 90 day and 1 refill

## 2022-06-13 ENCOUNTER — TELEPHONE (OUTPATIENT)
Dept: INTERNAL MEDICINE CLINIC | Facility: CLINIC | Age: 67
End: 2022-06-13

## 2022-06-14 NOTE — TELEPHONE ENCOUNTER
Progress note from [facility] to be reviewed by provider and signed. Triage to fax back once signed and completed.

## 2022-06-15 NOTE — TELEPHONE ENCOUNTER
Faxed signed orders back to [facility] at [phone] and fax confirmed. Signed document sent to scanning. TCB to call back to update pt.

## 2022-06-27 ENCOUNTER — TELEPHONE (OUTPATIENT)
Dept: INTERNAL MEDICINE CLINIC | Facility: CLINIC | Age: 67
End: 2022-06-27

## 2022-06-27 NOTE — TELEPHONE ENCOUNTER
Incoming (mail or fax):  fax  Received from:  [facility]  Documentation given to:  Triage in basket     Needs to be signed and  returned

## 2022-07-11 ENCOUNTER — MED REC SCAN ONLY (OUTPATIENT)
Dept: INTERNAL MEDICINE CLINIC | Facility: CLINIC | Age: 67
End: 2022-07-11

## 2022-07-28 ENCOUNTER — PATIENT MESSAGE (OUTPATIENT)
Dept: INTERNAL MEDICINE CLINIC | Facility: CLINIC | Age: 67
End: 2022-07-28

## 2022-07-29 ENCOUNTER — TELEMEDICINE (OUTPATIENT)
Dept: INTERNAL MEDICINE CLINIC | Facility: CLINIC | Age: 67
End: 2022-07-29

## 2022-07-29 DIAGNOSIS — U07.1 COVID-19 VIRUS INFECTION: Primary | ICD-10-CM

## 2022-07-29 PROCEDURE — 99214 OFFICE O/P EST MOD 30 MIN: CPT | Performed by: NURSE PRACTITIONER

## 2022-07-29 NOTE — TELEPHONE ENCOUNTER
From: Bharathi Gayle  To: Sanjeev Escalante MD  Sent: 2022 9:52 PM CDT  Subject: Covid testing    Today 22, I suddenly got a sore throat, reddish streaks in the throat, stuffy/runny nose, voice deepened. No fever, over the counter Covid tests have come back negative. From what I read you need to request the Covid test, if you think I should get one.     Thank you,  Haley SHARP 29-

## 2022-07-30 ENCOUNTER — PATIENT MESSAGE (OUTPATIENT)
Dept: INTERNAL MEDICINE CLINIC | Facility: CLINIC | Age: 67
End: 2022-07-30

## 2022-08-01 NOTE — TELEPHONE ENCOUNTER
From: Jessica Jay  To: Singh ArtistASH  Sent: 7/30/2022 4:52 PM CDT  Subject: Red stripes in back of throat    Do you know what these stripes are in the back of my throat. I've not read about them. I attached pics. I started on Paxlovid Friday 1pm. Had lots of coughing from nasal drainage and noticed these stripes in my throat. Not bothered by them, initial sore throat gone quickly.

## 2022-08-01 NOTE — TELEPHONE ENCOUNTER
Can't really see any stripes in these pictures but nothing worrisome seen, no tonsillar enlargement seen, no obvious bleeding seen. Recommend she finish the course of paxlovid and monitor symptoms. If still present in a week or if worsening prior to that she should schedule an appt for evaluation.

## 2022-08-02 NOTE — TELEPHONE ENCOUNTER
Faxed signed orders to [phone], fax confirmed and original sent to scan. Subjective:      Patient ID: Tess Schultz is a 75 y.o. female.    Chief Complaint: Follow-up (6 month)    Ms. Schultz is a 74-year-old female with HTN GERD that is here today for her 6 month follow-up   Overall patient is doing well and has no acute needs or complaints as such.  He does have a mole on the lumbar area which seems to be changing its character and is now more discolored and increased in size.  We discussed getting a referral to Dermatology    MM2021  BMD: scheduled in Feb  MCW: 22  CRS: 2019  PCV13: 2021, PPSV23 next visit      The patient's Health Maintenance was reviewed and the following appears to be due at this time:   Health Maintenance Due   Topic Date Due    Pneumococcal Vaccines (Age 65+) (2 - PPSV23 or PCV20) 2022        Past Medical History:  Past Medical History:   Diagnosis Date    Chronic GERD     Fatigue     HTN (hypertension)     Mixed hyperlipidemia     Vitamin D deficiency      Past Surgical History:   Procedure Laterality Date    CATARACT EXTRACTION      CHOLECYSTECTOMY      Fissure      TONSILLECTOMY      TOTAL ABDOMINAL HYSTERECTOMY       Review of patient's allergies indicates:   Allergen Reactions    Cider vinegar     Pollen extracts      Social History     Socioeconomic History    Marital status:    Tobacco Use    Smoking status: Never Smoker    Smokeless tobacco: Never Used   Substance and Sexual Activity    Alcohol use: Not Currently    Drug use: Never    Sexual activity: Yes     Family History   Problem Relation Age of Onset    Stroke Father        Review of Systems   Constitutional: Negative.         Alert, Oriented and no acute distress   HENT: Negative for congestion, rhinorrhea, sinus pain and sore throat.    Eyes: Negative for photophobia and discharge.   Respiratory: Negative for cough, chest tightness, shortness of breath and wheezing.    Cardiovascular: Negative for chest pain, palpitations and leg swelling.  "  Gastrointestinal: Negative for abdominal distention, constipation, diarrhea, nausea and vomiting.   Genitourinary: Negative for difficulty urinating, dyspareunia, dysuria, frequency, hematuria and menstrual problem.   Musculoskeletal: Negative for arthralgias, joint swelling, myalgias and neck pain.   Skin: Negative for rash.   Allergic/Immunologic: Negative for immunocompromised state.   Neurological: Negative for dizziness, seizures and weakness.   Psychiatric/Behavioral: Negative for agitation and suicidal ideas.       Objective:   /64 (BP Location: Left arm, Patient Position: Sitting, BP Method: Small (Manual))   Pulse 69   Temp 97.2 °F (36.2 °C) (Temporal)   Ht 5' 3" (1.6 m)   Wt 63.3 kg (139 lb 9.6 oz)   SpO2 98%   BMI 24.73 kg/m²     Physical Exam  Constitutional:       Appearance: Normal appearance.   HENT:      Head: Normocephalic and atraumatic.      Nose: Nose normal.      Mouth/Throat:      Mouth: Mucous membranes are moist.      Pharynx: Oropharynx is clear.   Eyes:      Extraocular Movements: Extraocular movements intact.      Pupils: Pupils are equal, round, and reactive to light.   Cardiovascular:      Rate and Rhythm: Normal rate and regular rhythm.      Pulses: Normal pulses.   Pulmonary:      Effort: Pulmonary effort is normal.      Breath sounds: Normal breath sounds.   Abdominal:      General: Bowel sounds are normal.      Palpations: Abdomen is soft.   Musculoskeletal:         General: Normal range of motion.      Cervical back: Normal range of motion and neck supple.   Skin:     General: Skin is warm.      Findings: Lesion (Atypical mole on the thoracolumbar area with discoloration) present.   Neurological:      General: No focal deficit present.      Mental Status: She is alert and oriented to person, place, and time. Mental status is at baseline.   Psychiatric:         Mood and Affect: Mood normal.       Assessment/ Plan:     Problem List Items Addressed This Visit        Derm "    Atypical mole     -sending referred to [facility] for a mole check            Relevant Orders    Ambulatory referral/consult to Dermatology       Cardiac/Vascular    Hyperlipidemia - Primary     -continue rosuvastatin  -diet and exercise emphasized           Hypertension     Well-controlled with current meds, continue   low-sodium diet  Some form of routine aerobic exercises emphasized              Endocrine    Vitamin D deficiency       GI    Gastroesophageal reflux disease     -patient advised to avoid foods that trigger acid reflux and he had at least 2 hours before bedtime.              Other    Fatigue          Orders Placed This Encounter   Procedures    Ambulatory referral/consult to Dermatology     Standing Status:   Future     Standing Expiration Date:   9/2/2023     Referral Priority:   Routine     Referral Type:   Consultation     Referral Reason:   Specialty Services Required     Requested Specialty:   Dermatology     Number of Visits Requested:   1       Medication List with Changes/Refills   Current Medications    LACTOBACILLUS RHAMNOSUS GG (CULTURELLE) 10 BILLION CELL CAPSULE    Take 1 capsule by mouth once daily.    LISINOPRIL-HYDROCHLOROTHIAZIDE (PRINZIDE,ZESTORETIC) 20-25 MG TAB    Take 1 tablet by mouth once daily at 6am.    ROSUVASTATIN (CRESTOR) 5 MG TABLET    Take 1 tablet by mouth once daily at 6am.   Discontinued Medications    OMEPRAZOLE (PRILOSEC) 20 MG CAPSULE    Take 1 capsule by mouth once daily at 6am.      Medication List with Changes/Refills   Current Medications    LACTOBACILLUS RHAMNOSUS GG (CULTURELLE) 10 BILLION CELL CAPSULE    Take 1 capsule by mouth once daily.       Start Date: --        End Date: --    LISINOPRIL-HYDROCHLOROTHIAZIDE (PRINZIDE,ZESTORETIC) 20-25 MG TAB    Take 1 tablet by mouth once daily at 6am.       Start Date: 10/27/2021End Date: --    ROSUVASTATIN (CRESTOR) 5 MG TABLET    Take 1 tablet by mouth once daily at 6am.       Start Date: 4/18/2022  End Date: --   Discontinued Medications    OMEPRAZOLE (PRILOSEC) 20 MG CAPSULE    Take 1 capsule by mouth once daily at 6am.       Start Date: 10/27/2021End Date: 8/2/2022

## 2022-08-10 ENCOUNTER — TELEPHONE (OUTPATIENT)
Dept: INTERNAL MEDICINE CLINIC | Facility: CLINIC | Age: 67
End: 2022-08-10

## 2022-08-10 ENCOUNTER — TELEMEDICINE (OUTPATIENT)
Dept: INTERNAL MEDICINE CLINIC | Facility: CLINIC | Age: 67
End: 2022-08-10

## 2022-08-10 VITALS — HEIGHT: 61.26 IN | TEMPERATURE: 100 F | BODY MASS INDEX: 33 KG/M2

## 2022-08-10 DIAGNOSIS — Z86.16 HISTORY OF COVID-19: ICD-10-CM

## 2022-08-10 DIAGNOSIS — J06.9 VIRAL URI WITH COUGH: Primary | ICD-10-CM

## 2022-08-10 PROCEDURE — 99214 OFFICE O/P EST MOD 30 MIN: CPT | Performed by: NURSE PRACTITIONER

## 2022-08-10 RX ORDER — BENZONATATE 200 MG/1
200 CAPSULE ORAL 3 TIMES DAILY PRN
Qty: 30 CAPSULE | Refills: 0 | Status: SHIPPED | OUTPATIENT
Start: 2022-08-10

## 2022-08-10 NOTE — PATIENT INSTRUCTIONS
Start benzonatate pearls as needed for the cough. You can use Robitussin DM or Mucinex DM as directed on the bottle for cough and chest congestion. Use Tylenol as needed for pain or fever. Stay hydrated. Go to ER or call 911 if worsening symptoms such as persistent fever >103, difficulty breathing, or chest pain. Return to clinic in one week as needed or when routine care is due.     Infection prevention:  - Proper hand hygiene is very important          - Wear a mask in public  - Avoid touching your mouth, nose, eyes, and face  - Practice social distancing and staying 6 ft away from other individuals  - Cough into your arm or a tissue  - Avoid contact with others if you have symptoms of an upper or lower respiratory infection  - Avoid contact with others who are ill  - Avoid direct contact with your pets if you are ill  - Disinfect surfaces with appropriate materials  - If your symptoms become severe (shortness of breath with rest or activity, fever, chest congestion, productive cough), go to the ER  - Recommend self-isolation at home if you are sick, wearing a mask if in room with other family members

## 2022-08-10 NOTE — TELEPHONE ENCOUNTER
Patient had COVID and is still having URI symptoms. [facility] call [phone]    Virtual/Telephone Consent    Delphine Sicard verbally consents to a Virtual/Telephone Check-In service on 8/10/22. Patient understands and accepts financial responsibility for any deductible, co-insurance and/or co-pays associated with this service.

## 2022-08-30 ENCOUNTER — PATIENT MESSAGE (OUTPATIENT)
Dept: INTERNAL MEDICINE CLINIC | Facility: CLINIC | Age: 67
End: 2022-08-30

## 2022-08-31 NOTE — TELEPHONE ENCOUNTER
From: Trenton Ghosh [facility]  To: ASH Viveros  Sent: 8/30/2022 4:13 PM CDT  Subject: Continuing Sore Throat     This past weekend I finally felt great. Coughing stopped and no sore throat Then Sunday nite the sore Throat came back and nothing is helping . I am off tomorrow afternoon and was wondering if I can get a throat swab. At this time I have been negative for Covid since 8/12.

## 2022-09-26 ENCOUNTER — LAB ENCOUNTER (OUTPATIENT)
Dept: LAB | Age: 67
End: 2022-09-26
Attending: INTERNAL MEDICINE

## 2022-09-26 DIAGNOSIS — K75.81 NASH (NONALCOHOLIC STEATOHEPATITIS): ICD-10-CM

## 2022-09-26 DIAGNOSIS — I10 ESSENTIAL HYPERTENSION: ICD-10-CM

## 2022-09-26 DIAGNOSIS — R73.01 IMPAIRED FASTING GLUCOSE: ICD-10-CM

## 2022-09-26 LAB
ALBUMIN SERPL-MCNC: 3.8 G/DL (ref 3.4–5)
ALBUMIN SERPL-MCNC: 3.8 G/DL (ref 3.4–5)
ALBUMIN/GLOB SERPL: 0.9 {RATIO} (ref 1–2)
ALP LIVER SERPL-CCNC: 71 U/L
ALP LIVER SERPL-CCNC: 73 U/L
ALT SERPL-CCNC: 71 U/L
ALT SERPL-CCNC: 74 U/L
ANION GAP SERPL CALC-SCNC: 5 MMOL/L (ref 0–18)
AST SERPL-CCNC: 47 U/L (ref 15–37)
AST SERPL-CCNC: 51 U/L (ref 15–37)
BASOPHILS # BLD AUTO: 0.03 X10(3) UL (ref 0–0.2)
BASOPHILS NFR BLD AUTO: 0.7 %
BILIRUB DIRECT SERPL-MCNC: 0.2 MG/DL (ref 0–0.2)
BILIRUB SERPL-MCNC: 1 MG/DL (ref 0.1–2)
BILIRUB SERPL-MCNC: 1.1 MG/DL (ref 0.1–2)
BUN BLD-MCNC: 18 MG/DL (ref 7–18)
CALCIUM BLD-MCNC: 10 MG/DL (ref 8.5–10.1)
CHLORIDE SERPL-SCNC: 101 MMOL/L (ref 98–112)
CHOLEST SERPL-MCNC: 145 MG/DL (ref ?–200)
CO2 SERPL-SCNC: 30 MMOL/L (ref 21–32)
CREAT BLD-MCNC: 0.85 MG/DL
EOSINOPHIL # BLD AUTO: 0.12 X10(3) UL (ref 0–0.7)
EOSINOPHIL NFR BLD AUTO: 2.8 %
ERYTHROCYTE [DISTWIDTH] IN BLOOD BY AUTOMATED COUNT: 12.1 %
EST. AVERAGE GLUCOSE BLD GHB EST-MCNC: 111 MG/DL (ref 68–126)
FASTING PATIENT LIPID ANSWER: YES
FASTING STATUS PATIENT QL REPORTED: YES
GFR SERPLBLD BASED ON 1.73 SQ M-ARVRAT: 75 ML/MIN/1.73M2 (ref 60–?)
GLOBULIN PLAS-MCNC: 4.2 G/DL (ref 2.8–4.4)
GLUCOSE BLD-MCNC: 112 MG/DL (ref 70–99)
HBA1C MFR BLD: 5.5 % (ref ?–5.7)
HCT VFR BLD AUTO: 48.1 %
HDLC SERPL-MCNC: 48 MG/DL (ref 40–59)
HGB BLD-MCNC: 15.6 G/DL
IMM GRANULOCYTES # BLD AUTO: 0.01 X10(3) UL (ref 0–1)
IMM GRANULOCYTES NFR BLD: 0.2 %
LDLC SERPL CALC-MCNC: 80 MG/DL (ref ?–100)
LYMPHOCYTES # BLD AUTO: 1.05 X10(3) UL (ref 1–4)
LYMPHOCYTES NFR BLD AUTO: 24.3 %
MCH RBC QN AUTO: 31.7 PG (ref 26–34)
MCHC RBC AUTO-ENTMCNC: 32.4 G/DL (ref 31–37)
MCV RBC AUTO: 97.8 FL
MONOCYTES # BLD AUTO: 0.55 X10(3) UL (ref 0.1–1)
MONOCYTES NFR BLD AUTO: 12.7 %
NEUTROPHILS # BLD AUTO: 2.56 X10 (3) UL (ref 1.5–7.7)
NEUTROPHILS # BLD AUTO: 2.56 X10(3) UL (ref 1.5–7.7)
NEUTROPHILS NFR BLD AUTO: 59.3 %
NONHDLC SERPL-MCNC: 97 MG/DL (ref ?–130)
OSMOLALITY SERPL CALC.SUM OF ELEC: 285 MOSM/KG (ref 275–295)
PLATELET # BLD AUTO: 176 10(3)UL (ref 150–450)
POTASSIUM SERPL-SCNC: 4.1 MMOL/L (ref 3.5–5.1)
PROT SERPL-MCNC: 8 G/DL (ref 6.4–8.2)
PROT SERPL-MCNC: 8.1 G/DL (ref 6.4–8.2)
RBC # BLD AUTO: 4.92 X10(6)UL
SODIUM SERPL-SCNC: 136 MMOL/L (ref 136–145)
TRIGL SERPL-MCNC: 88 MG/DL (ref 30–149)
VLDLC SERPL CALC-MCNC: 14 MG/DL (ref 0–30)
WBC # BLD AUTO: 4.3 X10(3) UL (ref 4–11)

## 2022-09-26 PROCEDURE — 36415 COLL VENOUS BLD VENIPUNCTURE: CPT

## 2022-09-26 PROCEDURE — 82248 BILIRUBIN DIRECT: CPT

## 2022-09-26 PROCEDURE — 80061 LIPID PANEL: CPT

## 2022-09-26 PROCEDURE — 80053 COMPREHEN METABOLIC PANEL: CPT

## 2022-09-26 PROCEDURE — 85025 COMPLETE CBC W/AUTO DIFF WBC: CPT

## 2022-09-26 PROCEDURE — 83036 HEMOGLOBIN GLYCOSYLATED A1C: CPT

## 2022-09-27 ENCOUNTER — HOSPITAL ENCOUNTER (OUTPATIENT)
Dept: ULTRASOUND IMAGING | Age: 67
Discharge: HOME | End: 2022-09-27
Attending: INTERNAL MEDICINE

## 2022-09-27 DIAGNOSIS — K75.81 NASH (NONALCOHOLIC STEATOHEPATITIS): ICD-10-CM

## 2022-09-27 PROCEDURE — 76705 ECHO EXAM OF ABDOMEN: CPT | Performed by: INTERNAL MEDICINE

## 2022-09-27 PROCEDURE — 76981 USE PARENCHYMA: CPT | Performed by: INTERNAL MEDICINE

## 2022-10-01 ENCOUNTER — HOSPITAL ENCOUNTER (OUTPATIENT)
Dept: MAMMOGRAPHY | Age: 67
Discharge: HOME | End: 2022-10-01
Attending: INTERNAL MEDICINE
Payer: MEDICARE

## 2022-10-01 DIAGNOSIS — Z12.31 SCREENING MAMMOGRAM, ENCOUNTER FOR: ICD-10-CM

## 2022-10-01 PROCEDURE — 77063 BREAST TOMOSYNTHESIS BI: CPT | Performed by: INTERNAL MEDICINE

## 2022-10-01 PROCEDURE — 77067 SCR MAMMO BI INCL CAD: CPT | Performed by: INTERNAL MEDICINE

## 2022-10-05 ENCOUNTER — TELEPHONE (OUTPATIENT)
Dept: INTERNAL MEDICINE CLINIC | Facility: CLINIC | Age: 67
End: 2022-10-05

## 2022-10-05 DIAGNOSIS — R74.8 ELEVATED LIVER ENZYMES: ICD-10-CM

## 2022-10-05 DIAGNOSIS — K75.81 NASH (NONALCOHOLIC STEATOHEPATITIS): ICD-10-CM

## 2022-10-05 DIAGNOSIS — K76.0 FATTY LIVER: Primary | ICD-10-CM

## 2022-10-05 DIAGNOSIS — K74.00 LIVER FIBROSIS: ICD-10-CM

## 2022-10-05 NOTE — TELEPHONE ENCOUNTER
Insurance: [facility] HMO  Provider's Name?: Dr Isi Cortes?: Hepatology    Reason for Visit?: unknown   Diagnosis?: Fatty liver   Number of Visits Requested?: unknown    Last Visit with Specialist?: unknown   Is Appt.  Already Scheduled?: yes        If so, Date?:  11/7/22  Once referral is approved pt can see referral via [address]

## 2022-10-06 NOTE — TELEPHONE ENCOUNTER
Spoke w referral department previous referral on file for dr Dony Luciano wouldn't work new referral for dr Amando Wiggins would have to be placed  Spoke w pt she is going to see replacement for dr Ruben Shaver for annual check up appt   Ok to place referral? Pended 3 visits   Is pt up to date on OVs with PCP?: yes  Has pt been seen/referred for condition?: yes  Is specialist in network?: yes  Health Maintenance up to date?: yes         If no, reminder given?: n/a    Called dr Amando Wiggins office spoke to [facility] location is [address] [place] [address]  [address] [facility] [address] fax is [phone]   Fax: [phone]

## 2022-10-06 NOTE — TELEPHONE ENCOUNTER
Referral authorized   Printed referral faxed to dr Heber Jha [phone]   Wrote certification # [id number] on the referral   Confirmation received

## 2022-10-08 ENCOUNTER — IMMUNIZATION (OUTPATIENT)
Dept: LAB | Age: 67
End: 2022-10-08
Attending: EMERGENCY MEDICINE
Payer: MEDICARE

## 2022-10-08 DIAGNOSIS — Z23 NEED FOR VACCINATION: Primary | ICD-10-CM

## 2022-10-08 PROCEDURE — 0124A SARSCOV2 VAC BVL 30MCG/0.3ML: CPT

## 2022-10-18 ENCOUNTER — OFFICE VISIT (OUTPATIENT)
Dept: INTERNAL MEDICINE CLINIC | Facility: CLINIC | Age: 67
End: 2022-10-18
Payer: MEDICARE

## 2022-10-18 VITALS
SYSTOLIC BLOOD PRESSURE: 122 MMHG | WEIGHT: 178.31 LBS | DIASTOLIC BLOOD PRESSURE: 78 MMHG | TEMPERATURE: 97 F | HEART RATE: 64 BPM | BODY MASS INDEX: 33.23 KG/M2 | RESPIRATION RATE: 12 BRPM | HEIGHT: 61.26 IN

## 2022-10-18 DIAGNOSIS — K76.0 FATTY LIVER: ICD-10-CM

## 2022-10-18 DIAGNOSIS — E66.9 OBESITY (BMI 30.0-34.9): ICD-10-CM

## 2022-10-18 DIAGNOSIS — I10 ESSENTIAL HYPERTENSION: ICD-10-CM

## 2022-10-18 DIAGNOSIS — I25.10 CORONARY ARTERY DISEASE INVOLVING NATIVE CORONARY ARTERY OF NATIVE HEART WITHOUT ANGINA PECTORIS: ICD-10-CM

## 2022-10-18 DIAGNOSIS — R73.01 IMPAIRED FASTING GLUCOSE: ICD-10-CM

## 2022-10-18 DIAGNOSIS — I77.9 BILATERAL CAROTID ARTERY DISEASE, UNSPECIFIED TYPE (HCC): Primary | ICD-10-CM

## 2022-10-18 PROCEDURE — 99214 OFFICE O/P EST MOD 30 MIN: CPT | Performed by: INTERNAL MEDICINE

## 2022-10-18 PROCEDURE — 3008F BODY MASS INDEX DOCD: CPT | Performed by: INTERNAL MEDICINE

## 2022-10-18 PROCEDURE — 3074F SYST BP LT 130 MM HG: CPT | Performed by: INTERNAL MEDICINE

## 2022-10-18 PROCEDURE — 3078F DIAST BP <80 MM HG: CPT | Performed by: INTERNAL MEDICINE

## 2022-11-07 ENCOUNTER — OFFICE VISIT (OUTPATIENT)
Dept: FAMILY MEDICINE CLINIC | Facility: CLINIC | Age: 67
End: 2022-11-07
Payer: MEDICARE

## 2022-11-07 ENCOUNTER — TELEPHONE (OUTPATIENT)
Dept: INTERNAL MEDICINE CLINIC | Facility: CLINIC | Age: 67
End: 2022-11-07

## 2022-11-07 VITALS
SYSTOLIC BLOOD PRESSURE: 138 MMHG | TEMPERATURE: 96 F | DIASTOLIC BLOOD PRESSURE: 84 MMHG | HEIGHT: 61 IN | HEART RATE: 72 BPM | RESPIRATION RATE: 16 BRPM | WEIGHT: 179 LBS | OXYGEN SATURATION: 98 % | BODY MASS INDEX: 33.79 KG/M2

## 2022-11-07 DIAGNOSIS — R09.81 NASAL CONGESTION: ICD-10-CM

## 2022-11-07 DIAGNOSIS — H65.91 FLUID LEVEL BEHIND TYMPANIC MEMBRANE OF RIGHT EAR: Primary | ICD-10-CM

## 2022-11-07 NOTE — TELEPHONE ENCOUNTER
Care Everywhere Records Received    Updated Care Everywhere: yes    Date of Service: 11/7/22    From What Facility: [address] [place] [address]    Speciality: Hepatologist    Type of Record:   Consult    Document Placed:  Dr Nicole Sheridan incoming folder

## 2022-11-27 DIAGNOSIS — I10 ESSENTIAL HYPERTENSION: ICD-10-CM

## 2022-11-30 RX ORDER — TRIAMTERENE AND HYDROCHLOROTHIAZIDE 37.5; 25 MG/1; MG/1
1 TABLET ORAL DAILY
Qty: 90 TABLET | Refills: 1 | Status: SHIPPED | OUTPATIENT
Start: 2022-11-30

## 2022-11-30 RX ORDER — LISINOPRIL 40 MG/1
TABLET ORAL
Qty: 90 TABLET | Refills: 1 | Status: SHIPPED | OUTPATIENT
Start: 2022-11-30

## 2022-11-30 RX ORDER — METOPROLOL SUCCINATE 100 MG/1
TABLET, EXTENDED RELEASE ORAL
Qty: 90 TABLET | Refills: 1 | Status: SHIPPED | OUTPATIENT
Start: 2022-11-30

## 2023-02-01 ENCOUNTER — HOSPITAL ENCOUNTER (OUTPATIENT)
Dept: ULTRASOUND IMAGING | Facility: HOSPITAL | Age: 68
Discharge: HOME | End: 2023-02-01
Attending: INTERNAL MEDICINE
Payer: MEDICARE

## 2023-02-01 DIAGNOSIS — K75.81 NASH (NONALCOHOLIC STEATOHEPATITIS): ICD-10-CM

## 2023-02-01 PROCEDURE — 76981 USE PARENCHYMA: CPT | Performed by: INTERNAL MEDICINE

## 2023-02-01 PROCEDURE — 76705 ECHO EXAM OF ABDOMEN: CPT | Performed by: INTERNAL MEDICINE

## 2023-04-20 ENCOUNTER — LAB ENCOUNTER (OUTPATIENT)
Dept: LAB | Age: 68
End: 2023-04-20
Attending: INTERNAL MEDICINE
Payer: MEDICARE

## 2023-04-20 DIAGNOSIS — R73.01 IMPAIRED FASTING GLUCOSE: ICD-10-CM

## 2023-04-20 DIAGNOSIS — I10 ESSENTIAL HYPERTENSION: ICD-10-CM

## 2023-04-20 LAB
EST. AVERAGE GLUCOSE BLD GHB EST-MCNC: 114 MG/DL (ref 68–126)
HBA1C MFR BLD: 5.6 % (ref ?–5.7)

## 2023-04-20 PROCEDURE — 83036 HEMOGLOBIN GLYCOSYLATED A1C: CPT

## 2023-04-20 PROCEDURE — 80053 COMPREHEN METABOLIC PANEL: CPT

## 2023-04-20 PROCEDURE — 80061 LIPID PANEL: CPT

## 2023-04-20 PROCEDURE — 36415 COLL VENOUS BLD VENIPUNCTURE: CPT

## 2023-04-22 ENCOUNTER — LAB ENCOUNTER (OUTPATIENT)
Dept: LAB | Facility: HOSPITAL | Age: 68
End: 2023-04-22
Attending: INTERNAL MEDICINE
Payer: MEDICARE

## 2023-04-22 LAB
ALBUMIN SERPL-MCNC: 3.7 G/DL (ref 3.4–5)
ALBUMIN/GLOB SERPL: 0.9 {RATIO} (ref 1–2)
ALP LIVER SERPL-CCNC: 77 U/L
ALT SERPL-CCNC: 70 U/L
ANION GAP SERPL CALC-SCNC: 3 MMOL/L (ref 0–18)
AST SERPL-CCNC: 60 U/L (ref 15–37)
BILIRUB SERPL-MCNC: 1.1 MG/DL (ref 0.1–2)
BUN BLD-MCNC: 20 MG/DL (ref 7–18)
CALCIUM BLD-MCNC: 9.1 MG/DL (ref 8.5–10.1)
CHLORIDE SERPL-SCNC: 103 MMOL/L (ref 98–112)
CHOLEST SERPL-MCNC: 129 MG/DL (ref ?–200)
CO2 SERPL-SCNC: 32 MMOL/L (ref 21–32)
CREAT BLD-MCNC: 0.83 MG/DL
FASTING PATIENT LIPID ANSWER: YES
FASTING STATUS PATIENT QL REPORTED: YES
GFR SERPLBLD BASED ON 1.73 SQ M-ARVRAT: 77 ML/MIN/1.73M2 (ref 60–?)
GLOBULIN PLAS-MCNC: 4.2 G/DL (ref 2.8–4.4)
GLUCOSE BLD-MCNC: 110 MG/DL (ref 70–99)
HDLC SERPL-MCNC: 42 MG/DL (ref 40–59)
LDLC SERPL CALC-MCNC: 65 MG/DL (ref ?–100)
NONHDLC SERPL-MCNC: 87 MG/DL (ref ?–130)
OSMOLALITY SERPL CALC.SUM OF ELEC: 289 MOSM/KG (ref 275–295)
POTASSIUM SERPL-SCNC: 3.7 MMOL/L (ref 3.5–5.1)
PROT SERPL-MCNC: 7.9 G/DL (ref 6.4–8.2)
SODIUM SERPL-SCNC: 138 MMOL/L (ref 136–145)
TRIGL SERPL-MCNC: 120 MG/DL (ref 30–149)
VLDLC SERPL CALC-MCNC: 18 MG/DL (ref 0–30)

## 2023-04-22 PROCEDURE — 36415 COLL VENOUS BLD VENIPUNCTURE: CPT

## 2023-04-22 PROCEDURE — 80053 COMPREHEN METABOLIC PANEL: CPT

## 2023-04-22 PROCEDURE — 80061 LIPID PANEL: CPT

## 2023-04-26 ENCOUNTER — LAB ENCOUNTER (OUTPATIENT)
Dept: LAB | Age: 68
End: 2023-04-26
Attending: INTERNAL MEDICINE
Payer: MEDICARE

## 2023-04-26 ENCOUNTER — TELEPHONE (OUTPATIENT)
Dept: INTERNAL MEDICINE CLINIC | Facility: CLINIC | Age: 68
End: 2023-04-26

## 2023-04-26 ENCOUNTER — OFFICE VISIT (OUTPATIENT)
Dept: INTERNAL MEDICINE CLINIC | Facility: CLINIC | Age: 68
End: 2023-04-26
Payer: MEDICARE

## 2023-04-26 VITALS
TEMPERATURE: 98 F | DIASTOLIC BLOOD PRESSURE: 78 MMHG | SYSTOLIC BLOOD PRESSURE: 130 MMHG | RESPIRATION RATE: 21 BRPM | WEIGHT: 178 LBS | BODY MASS INDEX: 33.61 KG/M2 | HEART RATE: 73 BPM | OXYGEN SATURATION: 98 % | HEIGHT: 61 IN

## 2023-04-26 DIAGNOSIS — I77.9 BILATERAL CAROTID ARTERY DISEASE, UNSPECIFIED TYPE (HCC): ICD-10-CM

## 2023-04-26 DIAGNOSIS — R91.1 LUNG NODULE: ICD-10-CM

## 2023-04-26 DIAGNOSIS — E66.09 CLASS 1 OBESITY DUE TO EXCESS CALORIES WITH SERIOUS COMORBIDITY AND BODY MASS INDEX (BMI) OF 32.0 TO 32.9 IN ADULT: ICD-10-CM

## 2023-04-26 DIAGNOSIS — Z12.31 ENCOUNTER FOR SCREENING MAMMOGRAM FOR BREAST CANCER: ICD-10-CM

## 2023-04-26 DIAGNOSIS — K76.0 FATTY LIVER: ICD-10-CM

## 2023-04-26 DIAGNOSIS — R73.01 IMPAIRED FASTING GLUCOSE: ICD-10-CM

## 2023-04-26 DIAGNOSIS — R68.89 COLD INTOLERANCE: ICD-10-CM

## 2023-04-26 DIAGNOSIS — I10 ESSENTIAL HYPERTENSION: ICD-10-CM

## 2023-04-26 DIAGNOSIS — Z00.00 ENCOUNTER FOR ANNUAL HEALTH EXAMINATION: Primary | ICD-10-CM

## 2023-04-26 DIAGNOSIS — I25.10 CORONARY ARTERY DISEASE INVOLVING NATIVE CORONARY ARTERY OF NATIVE HEART WITHOUT ANGINA PECTORIS: ICD-10-CM

## 2023-04-26 LAB
BASOPHILS # BLD AUTO: 0.02 X10(3) UL (ref 0–0.2)
BASOPHILS NFR BLD AUTO: 0.4 %
EOSINOPHIL # BLD AUTO: 0.13 X10(3) UL (ref 0–0.7)
EOSINOPHIL NFR BLD AUTO: 2.7 %
ERYTHROCYTE [DISTWIDTH] IN BLOOD BY AUTOMATED COUNT: 11.9 %
HCT VFR BLD AUTO: 45.8 %
HGB BLD-MCNC: 15.3 G/DL
IMM GRANULOCYTES # BLD AUTO: 0.01 X10(3) UL (ref 0–1)
IMM GRANULOCYTES NFR BLD: 0.2 %
LYMPHOCYTES # BLD AUTO: 1.03 X10(3) UL (ref 1–4)
LYMPHOCYTES NFR BLD AUTO: 21.1 %
MCH RBC QN AUTO: 31.5 PG (ref 26–34)
MCHC RBC AUTO-ENTMCNC: 33.4 G/DL (ref 31–37)
MCV RBC AUTO: 94.4 FL
MONOCYTES # BLD AUTO: 0.56 X10(3) UL (ref 0.1–1)
MONOCYTES NFR BLD AUTO: 11.5 %
NEUTROPHILS # BLD AUTO: 3.13 X10 (3) UL (ref 1.5–7.7)
NEUTROPHILS # BLD AUTO: 3.13 X10(3) UL (ref 1.5–7.7)
NEUTROPHILS NFR BLD AUTO: 64.1 %
PLATELET # BLD AUTO: 175 10(3)UL (ref 150–450)
RBC # BLD AUTO: 4.85 X10(6)UL
TSI SER-ACNC: 2.67 MIU/ML (ref 0.36–3.74)
WBC # BLD AUTO: 4.9 X10(3) UL (ref 4–11)

## 2023-04-26 PROCEDURE — 1125F AMNT PAIN NOTED PAIN PRSNT: CPT | Performed by: INTERNAL MEDICINE

## 2023-04-26 PROCEDURE — 99214 OFFICE O/P EST MOD 30 MIN: CPT | Performed by: INTERNAL MEDICINE

## 2023-04-26 PROCEDURE — 3008F BODY MASS INDEX DOCD: CPT | Performed by: INTERNAL MEDICINE

## 2023-04-26 PROCEDURE — 96160 PT-FOCUSED HLTH RISK ASSMT: CPT | Performed by: INTERNAL MEDICINE

## 2023-04-26 PROCEDURE — 85025 COMPLETE CBC W/AUTO DIFF WBC: CPT

## 2023-04-26 PROCEDURE — 36415 COLL VENOUS BLD VENIPUNCTURE: CPT

## 2023-04-26 PROCEDURE — 84443 ASSAY THYROID STIM HORMONE: CPT

## 2023-04-26 PROCEDURE — 3075F SYST BP GE 130 - 139MM HG: CPT | Performed by: INTERNAL MEDICINE

## 2023-04-26 PROCEDURE — 3078F DIAST BP <80 MM HG: CPT | Performed by: INTERNAL MEDICINE

## 2023-04-26 PROCEDURE — G0439 PPPS, SUBSEQ VISIT: HCPCS | Performed by: INTERNAL MEDICINE

## 2023-05-16 DIAGNOSIS — I10 ESSENTIAL HYPERTENSION: ICD-10-CM

## 2023-05-16 RX ORDER — METOPROLOL SUCCINATE 100 MG/1
100 TABLET, EXTENDED RELEASE ORAL DAILY
Qty: 90 TABLET | Refills: 1 | Status: SHIPPED | OUTPATIENT
Start: 2023-05-16

## 2023-05-16 RX ORDER — LISINOPRIL 40 MG/1
40 TABLET ORAL DAILY
Qty: 90 TABLET | Refills: 1 | Status: SHIPPED | OUTPATIENT
Start: 2023-05-16

## 2023-05-17 RX ORDER — TRIAMTERENE AND HYDROCHLOROTHIAZIDE 37.5; 25 MG/1; MG/1
1 TABLET ORAL DAILY
Qty: 90 TABLET | Refills: 1 | Status: SHIPPED | OUTPATIENT
Start: 2023-05-17

## 2023-07-17 ENCOUNTER — TELEPHONE (OUTPATIENT)
Dept: INTERNAL MEDICINE CLINIC | Facility: CLINIC | Age: 68
End: 2023-07-17

## 2023-07-17 NOTE — TELEPHONE ENCOUNTER
Recommendation for statin. Refused as pt sees cardiology and statin has been discussed with them. This recommendation should be sent to cardiology. Form signed.

## 2023-07-17 NOTE — TELEPHONE ENCOUNTER
Incoming (mail or fax):  fax  Received from:  [facility]  Documentation given to:  triage incoming    Medication therapy recommendation response required

## 2023-08-08 LAB — AMB EXT OCCULT BLOOD RESULT: NEGATIVE

## 2023-08-16 ENCOUNTER — TELEPHONE (OUTPATIENT)
Dept: INTERNAL MEDICINE CLINIC | Facility: CLINIC | Age: 68
End: 2023-08-16

## 2023-08-16 NOTE — TELEPHONE ENCOUNTER
This is a fit test.  She had a cologuard due later this year that she signed for in 4/2023. Does she have the kit? Remind her to do it in 11/2023. Fit test is valid for a year so please change in health maintenance accordingly. Sent for scanning.

## 2023-08-17 DIAGNOSIS — I10 ESSENTIAL HYPERTENSION: ICD-10-CM

## 2023-08-17 NOTE — TELEPHONE ENCOUNTER
Called cologuard. Will automatically be sent to patient 11/2023. Patient aware to complete in 11/2023.

## 2023-08-18 RX ORDER — TRIAMTERENE AND HYDROCHLOROTHIAZIDE 37.5; 25 MG/1; MG/1
1 TABLET ORAL DAILY
Qty: 90 TABLET | Refills: 1 | OUTPATIENT
Start: 2023-08-18

## 2023-08-18 NOTE — TELEPHONE ENCOUNTER
6 month supply send on 5/3/23. Confirmed with pharmacy that 90 day supply was picked up on 8/14/23. Pt due for apt in October for further refills. Will deny Rx at this time. Message sent to pt to schedule med check in October.

## 2023-09-28 ENCOUNTER — PATIENT MESSAGE (OUTPATIENT)
Dept: INTERNAL MEDICINE CLINIC | Facility: CLINIC | Age: 68
End: 2023-09-28

## 2023-09-28 DIAGNOSIS — I25.10 CORONARY ARTERY DISEASE INVOLVING NATIVE CORONARY ARTERY OF NATIVE HEART WITHOUT ANGINA PECTORIS: Primary | ICD-10-CM

## 2023-09-28 NOTE — TELEPHONE ENCOUNTER
From: Ilana Cardenas  To: Linnea Freed  Sent: 9/28/2023 6:57 AM CDT  Subject: Referral for Cardio Dr. Louisa Tong    I have an appt with Dr. Neelima Man on 10/18 at 1pm.  I need to get a referral for him. I will be getting a fasting bloodwork for him as well. Last appt was 10/18/22. FYI - I have mammogram scheduled for 11/2/23. Last was 10/1/22. My last appt w/ Dr Jose Eduardo Baez was 4/26/23. I will make 6 mo appt. Thanks [email or web address]

## 2023-11-02 ENCOUNTER — HOSPITAL ENCOUNTER (OUTPATIENT)
Dept: MAMMOGRAPHY | Age: 68
Discharge: HOME | End: 2023-11-02
Attending: INTERNAL MEDICINE
Payer: MEDICARE

## 2023-11-02 DIAGNOSIS — Z12.31 ENCOUNTER FOR SCREENING MAMMOGRAM FOR BREAST CANCER: ICD-10-CM

## 2023-11-02 PROCEDURE — 77067 SCR MAMMO BI INCL CAD: CPT | Performed by: INTERNAL MEDICINE

## 2023-11-02 PROCEDURE — 77063 BREAST TOMOSYNTHESIS BI: CPT | Performed by: INTERNAL MEDICINE

## 2023-11-15 ENCOUNTER — TELEPHONE (OUTPATIENT)
Dept: INTERNAL MEDICINE CLINIC | Facility: CLINIC | Age: 68
End: 2023-11-15

## 2023-11-15 DIAGNOSIS — I10 ESSENTIAL HYPERTENSION: ICD-10-CM

## 2023-11-15 RX ORDER — TRIAMTERENE AND HYDROCHLOROTHIAZIDE 37.5; 25 MG/1; MG/1
1 TABLET ORAL DAILY
Qty: 90 TABLET | Refills: 0 | Status: SHIPPED | OUTPATIENT
Start: 2023-11-15

## 2023-11-15 RX ORDER — METOPROLOL SUCCINATE 100 MG/1
100 TABLET, EXTENDED RELEASE ORAL DAILY
Qty: 90 TABLET | Refills: 0 | Status: SHIPPED | OUTPATIENT
Start: 2023-11-15

## 2023-11-15 RX ORDER — LISINOPRIL 40 MG/1
40 TABLET ORAL DAILY
Qty: 90 TABLET | Refills: 0 | Status: SHIPPED | OUTPATIENT
Start: 2023-11-15

## 2023-11-15 NOTE — TELEPHONE ENCOUNTER
Last OV relevant to medication: 4/26/23  Last refill date: 5/16/23  #/refills: 90+1  When pt was asked to return for OV: 6 mo  Upcoming appt/reason: 12/29/23  Was pt informed of any over due labs: do you want COMP prior to next appt?

## 2023-12-01 LAB — AMB EXT COLOGUARD RESULT: NEGATIVE

## 2023-12-08 ENCOUNTER — TELEPHONE (OUTPATIENT)
Dept: INTERNAL MEDICINE CLINIC | Facility: CLINIC | Age: 68
End: 2023-12-08

## 2023-12-29 ENCOUNTER — LAB ENCOUNTER (OUTPATIENT)
Dept: LAB | Age: 68
End: 2023-12-29
Attending: INTERNAL MEDICINE
Payer: MEDICARE

## 2023-12-29 ENCOUNTER — OFFICE VISIT (OUTPATIENT)
Dept: INTERNAL MEDICINE CLINIC | Facility: CLINIC | Age: 68
End: 2023-12-29
Payer: MEDICARE

## 2023-12-29 VITALS
HEIGHT: 61 IN | RESPIRATION RATE: 12 BRPM | HEART RATE: 72 BPM | WEIGHT: 177.13 LBS | BODY MASS INDEX: 33.44 KG/M2 | DIASTOLIC BLOOD PRESSURE: 60 MMHG | SYSTOLIC BLOOD PRESSURE: 100 MMHG | TEMPERATURE: 97 F

## 2023-12-29 DIAGNOSIS — E66.9 OBESITY (BMI 30.0-34.9): ICD-10-CM

## 2023-12-29 DIAGNOSIS — I83.90 VARICOSE VEINS: ICD-10-CM

## 2023-12-29 DIAGNOSIS — K76.0 FATTY LIVER: ICD-10-CM

## 2023-12-29 DIAGNOSIS — I77.9 BILATERAL CAROTID ARTERY DISEASE, UNSPECIFIED TYPE (HCC): ICD-10-CM

## 2023-12-29 DIAGNOSIS — R73.01 IMPAIRED FASTING GLUCOSE: ICD-10-CM

## 2023-12-29 DIAGNOSIS — I10 ESSENTIAL HYPERTENSION: ICD-10-CM

## 2023-12-29 DIAGNOSIS — I25.10 CORONARY ARTERY DISEASE INVOLVING NATIVE CORONARY ARTERY OF NATIVE HEART WITHOUT ANGINA PECTORIS: ICD-10-CM

## 2023-12-29 DIAGNOSIS — G89.29 TOE PAIN, CHRONIC, RIGHT: ICD-10-CM

## 2023-12-29 DIAGNOSIS — M79.674 TOE PAIN, CHRONIC, RIGHT: ICD-10-CM

## 2023-12-29 DIAGNOSIS — I10 ESSENTIAL HYPERTENSION: Primary | ICD-10-CM

## 2023-12-29 LAB
ALBUMIN SERPL-MCNC: 3.8 G/DL (ref 3.4–5)
ALBUMIN/GLOB SERPL: 0.9 {RATIO} (ref 1–2)
ALP LIVER SERPL-CCNC: 79 U/L
ALT SERPL-CCNC: 77 U/L
ANION GAP SERPL CALC-SCNC: 7 MMOL/L (ref 0–18)
AST SERPL-CCNC: 64 U/L (ref 15–37)
BILIRUB SERPL-MCNC: 0.9 MG/DL (ref 0.1–2)
BUN BLD-MCNC: 18 MG/DL (ref 9–23)
CALCIUM BLD-MCNC: 9.6 MG/DL (ref 8.5–10.1)
CHLORIDE SERPL-SCNC: 101 MMOL/L (ref 98–112)
CO2 SERPL-SCNC: 31 MMOL/L (ref 21–32)
CREAT BLD-MCNC: 0.94 MG/DL
EGFRCR SERPLBLD CKD-EPI 2021: 66 ML/MIN/1.73M2 (ref 60–?)
FASTING STATUS PATIENT QL REPORTED: YES
GLOBULIN PLAS-MCNC: 4.2 G/DL (ref 2.8–4.4)
GLUCOSE BLD-MCNC: 105 MG/DL (ref 70–99)
OSMOLALITY SERPL CALC.SUM OF ELEC: 290 MOSM/KG (ref 275–295)
POTASSIUM SERPL-SCNC: 3.6 MMOL/L (ref 3.5–5.1)
PROT SERPL-MCNC: 8 G/DL (ref 6.4–8.2)
SODIUM SERPL-SCNC: 139 MMOL/L (ref 136–145)

## 2023-12-29 PROCEDURE — 3074F SYST BP LT 130 MM HG: CPT | Performed by: INTERNAL MEDICINE

## 2023-12-29 PROCEDURE — 3078F DIAST BP <80 MM HG: CPT | Performed by: INTERNAL MEDICINE

## 2023-12-29 PROCEDURE — 99214 OFFICE O/P EST MOD 30 MIN: CPT | Performed by: INTERNAL MEDICINE

## 2023-12-29 PROCEDURE — 3008F BODY MASS INDEX DOCD: CPT | Performed by: INTERNAL MEDICINE

## 2023-12-29 PROCEDURE — 36415 COLL VENOUS BLD VENIPUNCTURE: CPT

## 2023-12-29 PROCEDURE — 80053 COMPREHEN METABOLIC PANEL: CPT

## 2023-12-29 PROCEDURE — 1159F MED LIST DOCD IN RCRD: CPT | Performed by: INTERNAL MEDICINE

## 2024-02-11 DIAGNOSIS — I10 ESSENTIAL HYPERTENSION: ICD-10-CM

## 2024-02-13 RX ORDER — METOPROLOL SUCCINATE 100 MG/1
100 TABLET, EXTENDED RELEASE ORAL DAILY
Qty: 90 TABLET | Refills: 1 | Status: SHIPPED | OUTPATIENT
Start: 2024-02-13

## 2024-02-13 RX ORDER — LISINOPRIL 40 MG/1
40 TABLET ORAL DAILY
Qty: 90 TABLET | Refills: 1 | Status: SHIPPED | OUTPATIENT
Start: 2024-02-13

## 2024-02-14 DIAGNOSIS — I10 ESSENTIAL HYPERTENSION: ICD-10-CM

## 2024-02-14 RX ORDER — TRIAMTERENE AND HYDROCHLOROTHIAZIDE 37.5; 25 MG/1; MG/1
1 TABLET ORAL DAILY
Qty: 90 TABLET | Refills: 1 | Status: SHIPPED | OUTPATIENT
Start: 2024-02-14

## 2024-04-29 NOTE — PROGRESS NOTES
Subjective:   Tessa Rosario is a 68 year old female who presents for a MA (Medicare Advantage) Supervisit (Once per calendar year) and med check.     mammo due 11/2024. Ordered.   Declines pelvic exam today. Breast exam done.    cologuard done 11/2023 was negative.   dexa done 2021 was normal.      Up to date tdap, rsv.   Up to date prevnar, pneumovax.   She will get shingrix, covid booster (last 9/2023) at her local pharmacy.      [facility] flowsheet reviewed.   No falls.   Memory testing normal.   Mood has been stable. No depression.   Seeing eye doctor yearly.      History/Other:   Fall Risk Assessment:   She has been screened for Falls and is low risk.      Cognitive Assessment:   She had a completely normal cognitive assessment - see flowsheet entries       Functional Ability/Status:   Tessa Rosario has a completely normal functional assessment. See flowsheet for details.      Depression Screening (PHQ-2/PHQ-9): PHQ-2 SCORE: 0  , done 4/30/2024   Last [place] Suicide Screening on 4/30/2024 was No Risk.       Advanced Directives:   She does NOT have a Living Will. [Do you have a living will?: No]  She does NOT have a Power of  for Health Care. [Do you have a healthcare power of ?: No (has packet)]  Discussed with patient and provided information.        Patient Active Problem List   Diagnosis    Essential hypertension    Class 1 obesity due to excess calories with serious comorbidity and body mass index (BMI) of 32.0 to 32.9 in adult    Lung nodule    Fatty liver    Bilateral carotid artery disease (HCC)    Coronary artery disease involving native coronary artery of native heart without angina pectoris    Impaired fasting glucose     Allergies:  She is allergic to aldomet [methyldopa] and methyldopa.    Current Medications:  Outpatient Medications Marked as Taking for the 4/30/24 encounter (Office Visit) with Kathryn Goodman MD   Medication Sig    Triamterene-HCTZ 37.5-25 MG Oral Tab TAKE 1  TABLET BY MOUTH DAILY    lisinopril 40 MG Oral Tab TAKE 1 TABLET(40 MG) BY MOUTH DAILY    metoprolol succinate  MG Oral Tablet 24 Hr TAKE 1 TABLET(100 MG) BY MOUTH DAILY    ASPIRIN 81 OR Take by mouth daily.    vitamin E 400 UNITS Oral Cap Take 1 capsule (400 Units total) by mouth daily. Take 2 daily (800mg)    Multiple Vitamins-Minerals (MULTIVITAMIN OR) Take 1 tablet by mouth daily.         Medical History:  She  has a past medical history of Abnormal mammogram (2012), Abnormal mammogram, unspecified (2012), BPPV (benign paroxysmal positional vertigo), Cholecystitis with cholelithiasis (3/27/2014), COVID, Disorder of liver, Dysuria, Elevated alkaline phosphatase level (2013), High blood pressure, HTN (hypertension), Lung nodule (2013), Obesity, Strabismus, Unspecified essential hypertension (2012), Visual impairment, Vitamin D deficiency (2013), and Vulvovaginitis.  Surgical History:  She  has a past surgical history that includes hernia surgery; cholecystectomy; other surgical history (2018);  (1982); and yves biopsy stereo nodule 1 site right (cpt=19081) (2014).   Family History:  Her family history includes Diabetes in her maternal grandmother; Hypertension in her mother; Obesity in her father and sister; Stroke in her mother; diabetes (age of onset: 40) in her maternal grandmother; htn in her mother; stroke in her mother.  Social History:  She  reports that she has never smoked. She has never used smokeless tobacco. She reports that she does not drink alcohol and does not use drugs.    Tobacco:  She has never smoked tobacco.    CAGE Alcohol Screen:   CAGE screening score of 0 on 2024, showing low risk of alcohol abuse.      Patient Care Team:  Kathryn Goodman MD as PCP - General (Internal Medicine)  Ammon Estrella MD (CARDIOLOGY)    Review of Systems  GENERAL: feels well otherwise  SKIN: denies any unusual skin lesions  EYES:denies blurred vision or  double vision  HEENT: denies nasal congestion, sinus pain or ST  LUNGS: denies shortness of breath with exertion  CARDIOVASCULAR: denies chest pain on exertion  GI: denies abdominal pain,denies heartburn  : denies dysuria, vaginal discharge or itching  MUSCULOSKELETAL: denies back pain  NEURO: denies headaches  PSYCHE: denies depression or anxiety  HEMATOLOGIC: denies hx of anemia  ENDOCRINE: denies thyroid history  ALL/ASTHMA: denies hx of allergy or asthma     Objective:   Physical Exam  General Appearance:  Alert, cooperative, no distress, appears stated age   Head:  Normocephalic, without obvious abnormality, atraumatic   Eyes:  PERRL, conjunctiva/corneas clear, EOM's intact both eyes   Ears:  Normal TM's and external ear canals, both ears   Nose: Nares normal, septum midline,mucosa normal, no drainage or sinus tenderness   Throat: Lips, mucosa, and tongue normal; teeth and gums normal   Neck: Supple, symmetrical, trachea midline, no adenopathy;  thyroid: not enlarged,  no carotid bruit or JVD   Back:   Symmetric, no curvature, ROM normal, no CVA tenderness   Lungs:   Clear to auscultation bilaterally, respirations unlabored   Heart:  Regular rate and rhythm, S1 and S2 normal, no murmur, rub, or gallop   Abdomen:   Soft, non-tender, bowel sounds active all four quadrants,  no masses, no organomegaly   Pelvic: Deferred   Extremities: Extremities normal, atraumatic, no cyanosis or edema   Pulses: 2+ and symmetric   Skin: Skin color, texture, turgor normal, no rashes or lesions   Lymph nodes: Cervical, supraclavicular, and axillary nodes normal   Neurologic: Normal   Breast: symmetric, no masses, no tenderness, no nipple discharge.     /76 (BP Location: Left arm, Patient Position: Sitting, Cuff Size: large)   Pulse 64   Temp 97.6 °F (36.4 °C) (Temporal)   Resp 16   Ht 5' 1.25\" (1.556 m)   Wt 179 lb 9.6 oz (81.5 kg)   LMP 10/14/2010   Breastfeeding No   BMI 33.66 kg/m²  Estimated body mass index is  33.66 kg/m² as calculated from the following:    Height as of this encounter: 5' 1.25\" (1.556 m).    Weight as of this encounter: 179 lb 9.6 oz (81.5 kg).    Medicare Hearing Assessment:   Hearing Screening    Screening Method: Finger Rub  Finger Rub Result: Pass               Assessment & Plan:   Tessa Rosario is a 68 year old female who presents for a Medicare Assessment.     1. Encounter for annual health examination  mammo due 11/2024. Ordered.   Declines pelvic exam today. Breast exam done.    cologuard done 11/2023 was negative.   dexa done 2021 was normal.      Up to date tdap, rsv.   Up to date prevnar, pneumovax.   She will get shingrix, covid booster (last 9/2023) at her local pharmacy.      [facility] flowsheet reviewed.   No falls.   Memory testing normal.   Mood has been stable. No depression.   Seeing eye doctor yearly.     2. Essential hypertension  Continue meds.   Do labs.   - Lipid Panel; Future  - Comp Metabolic Panel (14); Future  - CBC With Differential With Platelet; Future  - TSH W Reflex To Free T4; Future    3. Coronary artery disease involving native coronary artery of native heart without angina pectoris  Stable. Continue current management.    follow up with cardiology.     4. Bilateral carotid artery disease, unspecified type (HCC)  Not on statin due to fatty liver.   No symptoms.     5. Lung nodule  Stable on ct from [id number]. No new symptoms.     6. Impaired fasting glucose  Low carb diet.   - Hemoglobin A1C; Future    7. Fatty liver  Low fat low carb diet. Regular exercise.   follow up with dr. García.   He has recommended against statin at this time per pt.   Had a fibroscan in 1/2024 which was consistent with advanced fibrosis.     8. Class 1 obesity due to excess calories with serious comorbidity and body mass index (BMI) of 32.0 to 32.9 in adult  Work on diet and exercise.   [facility] weight loss clinic.     9. Encounter for screening mammogram for malignant neoplasm of breast  - ABDULKADIR  ANA CRISTINA 2D+3D SCREENING BILAT (CPT=77067/24379); Future      The patient indicates understanding of these issues and agrees to the plan.  Reinforced healthy diet, lifestyle, and exercise.      Return in about 6 months (around 10/30/2024) for med check.     Kathryn Goodman MD, 4/28/2024     Supplementary Documentation:   General Health:  In the past six months, have you lost more than 10 pounds without trying?: 2 - No  Has your appetite been poor?: Yes  Type of Diet: Balanced;Low Salt  How does the patient maintain a good energy level?: Other (keep active)  How would you describe your daily physical activity?: Moderate  How would you describe your current health state?: Good  How do you maintain positive mental well-being?: Social Interaction;Visiting Friends;Visiting Family  On a scale of 0 to 10, with 0 being no pain and 10 being severe pain, what is your pain level?: 3 - (Mild) (ankle pain when on feet a lot)  In the past six months, have you experienced urine leakage?: 0-No  At any time do you feel concerned for the safety/well-being of yourself and/or your children, in your home or elsewhere?: No  Have you had any immunizations at another office such as Influenza, Hepatitis B, Tetanus, or Pneumococcal?: Yes       Tessa Rosario's SCREENING SCHEDULE   Tests on this list are recommended by your physician but may not be covered, or covered at this frequency, by your insurer.   Please check with your insurance carrier before scheduling to verify coverage.   PREVENTATIVE SERVICES FREQUENCY &  COVERAGE DETAILS LAST COMPLETION DATE   Diabetes Screening    Fasting Blood Sugar /  Glucose    One screening every 12 months if never tested or if previously tested but not diagnosed with pre-diabetes   One screening every 6 months if diagnosed with pre-diabetes Lab Results   Component Value Date     (H) 12/29/2023        Cardiovascular Disease Screening    Lipid Panel  Cholesterol  Lipoprotein (HDL)  Triglycerides Covered  every 5 years for all Medicare beneficiaries without apparent signs or symptoms of cardiovascular disease Lab Results   Component Value Date    CHOLEST 129 04/22/2023    HDL 42 04/22/2023    LDL 65 04/22/2023    TRIG 120 04/22/2023         Electrocardiogram (EKG)   Covered if needed at Welcome to Medicare, and non-screening if indicated for medical reasons 11/05/2018      Ultrasound Screening for Abdominal Aortic Aneurysm (AAA) Covered once in a lifetime for one of the following risk factors    Men who are 65-75 years old and have ever smoked    Anyone with a family history -     Colorectal Cancer Screening  Covered for ages 50-85; only need ONE of the following:    Colonoscopy   Covered every 10 years    Covered every 2 years if patient is at high risk or previous colonoscopy was abnormal 10/09/2016    Health Maintenance   Topic Date Due    Colorectal Cancer Screening  12/01/2026       Flexible Sigmoidoscopy   Covered every 4 years -    Fecal Occult Blood Test Covered annually 08/08/2023   Bone Density Screening    Bone density screening    Covered every 2 years after age 65 if diagnosed with risk of osteoporosis or estrogen deficiency.    Covered yearly for long-term glucocorticoid medication use (Steroids) Last Dexa Scan:    XR DEXA BONE DENSITOMETRY (CPT=77080) 03/08/2021      No recommendations at this time   Pap and Pelvic    Pap   Covered every 2 years for women at normal risk; Annually if at high risk -  Health Maintenance   Topic Date Due    Pap Smear  10/23/2023       Chlamydia Annually if high risk 08/08/2023  No recommendations at this time   Screening Mammogram    Mammogram     Recommend annually for all female patients aged 40 and older    One baseline mammogram covered for patients aged 35-39 11/02/2023    Health Maintenance   Topic Date Due    Mammogram  11/02/2024       Immunizations    Influenza Covered once per flu season  Please get every year 09/01/2023  No recommendations at this time     Pneumococcal Each vaccine ([id number] & [id number]) covered once after 65 Prevnar 13: 11/12/2015    [id number]: 08/22/2021     No recommendations at this time    Hepatitis B One screening covered for patients with certain risk factors   -  No recommendations at this time    Tetanus Toxoid Not covered by Medicare Part B unless medically necessary (cut with metal); may be covered with your pharmacy prescription benefits -    Tetanus, Diptheria and Pertusis TD and TDaP Not covered by Medicare Part B -  No recommendations at this time    Zoster Not covered by Medicare Part B; may be covered with your pharmacy  prescription benefits 10/03/2015  Zoster Vaccines(2 of 3) due on 11/28/2015     Annual Monitoring of Persistent Medications (ACE/ARB, digoxin diuretics, anticonvulsants)    Potassium Annually Lab Results   Component Value Date    K 3.6 12/29/2023         Creatinine   Annually Lab Results   Component Value Date    CREATSERUM 0.94 12/29/2023         BUN Annually Lab Results   Component Value Date    BUN 18 12/29/2023       Drug Serum Conc Annually No results found for: \"DIGOXIN\", \"DIG\", \"VALP\"

## 2024-04-30 ENCOUNTER — OFFICE VISIT (OUTPATIENT)
Dept: INTERNAL MEDICINE CLINIC | Facility: CLINIC | Age: 69
End: 2024-04-30
Payer: MEDICARE

## 2024-04-30 VITALS
SYSTOLIC BLOOD PRESSURE: 110 MMHG | BODY MASS INDEX: 33.48 KG/M2 | RESPIRATION RATE: 16 BRPM | HEART RATE: 64 BPM | DIASTOLIC BLOOD PRESSURE: 76 MMHG | HEIGHT: 61.25 IN | WEIGHT: 179.63 LBS | TEMPERATURE: 98 F

## 2024-04-30 DIAGNOSIS — I10 ESSENTIAL HYPERTENSION: ICD-10-CM

## 2024-04-30 DIAGNOSIS — Z12.31 ENCOUNTER FOR SCREENING MAMMOGRAM FOR MALIGNANT NEOPLASM OF BREAST: ICD-10-CM

## 2024-04-30 DIAGNOSIS — I77.9 BILATERAL CAROTID ARTERY DISEASE, UNSPECIFIED TYPE (HCC): ICD-10-CM

## 2024-04-30 DIAGNOSIS — Z00.00 ENCOUNTER FOR ANNUAL HEALTH EXAMINATION: Primary | ICD-10-CM

## 2024-04-30 DIAGNOSIS — R91.1 LUNG NODULE: ICD-10-CM

## 2024-04-30 DIAGNOSIS — E66.09 CLASS 1 OBESITY DUE TO EXCESS CALORIES WITH SERIOUS COMORBIDITY AND BODY MASS INDEX (BMI) OF 32.0 TO 32.9 IN ADULT: ICD-10-CM

## 2024-04-30 DIAGNOSIS — R73.01 IMPAIRED FASTING GLUCOSE: ICD-10-CM

## 2024-04-30 DIAGNOSIS — I25.10 CORONARY ARTERY DISEASE INVOLVING NATIVE CORONARY ARTERY OF NATIVE HEART WITHOUT ANGINA PECTORIS: ICD-10-CM

## 2024-04-30 DIAGNOSIS — K76.0 FATTY LIVER: ICD-10-CM

## 2024-04-30 NOTE — PATIENT INSTRUCTIONS
Tessa Rosario's SCREENING SCHEDULE   Tests on this list are recommended by your physician but may not be covered, or covered at this frequency, by your insurer.   Please check with your insurance carrier before scheduling to verify coverage.   PREVENTATIVE SERVICES FREQUENCY &  COVERAGE DETAILS LAST COMPLETION DATE   Diabetes Screening    Fasting Blood Sugar /  Glucose    One screening every 12 months if never tested or if previously tested but not diagnosed with pre-diabetes   One screening every 6 months if diagnosed with pre-diabetes Lab Results   Component Value Date     (H) 12/29/2023        Cardiovascular Disease Screening    Lipid Panel  Cholesterol  Lipoprotein (HDL)  Triglycerides Covered every 5 years for all Medicare beneficiaries without apparent signs or symptoms of cardiovascular disease Lab Results   Component Value Date    CHOLEST 129 04/22/2023    HDL 42 04/22/2023    LDL 65 04/22/2023    TRIG 120 04/22/2023         Electrocardiogram (EKG)   Covered if needed at Welcome to Medicare, and non-screening if indicated for medical reasons 11/05/2018      Ultrasound Screening for Abdominal Aortic Aneurysm (AAA) Covered once in a lifetime for one of the following risk factors   • Men who are 65-75 years old and have ever smoked   • Anyone with a family history -     Colorectal Cancer Screening  Covered for ages 50-85; only need ONE of the following:    Colonoscopy   Covered every 10 years    Covered every 2 years if patient is at high risk or previous colonoscopy was abnormal 10/09/2016    Health Maintenance   Topic Date Due   • Colorectal Cancer Screening  12/01/2026       Flexible Sigmoidoscopy   Covered every 4 years -    Fecal Occult Blood Test Covered annually 08/08/2023   Bone Density Screening    Bone density screening    Covered every 2 years after age 65 if diagnosed with risk of osteoporosis or estrogen deficiency.    Covered yearly for long-term glucocorticoid medication use  (Steroids) Last Dexa Scan:    XR DEXA BONE DENSITOMETRY (CPT=77080) 03/08/2021      No recommendations at this time   Pap and Pelvic    Pap   Covered every 2 years for women at normal risk; Annually if at high risk -  Health Maintenance   Topic Date Due   • Pap Smear  10/23/2023       Chlamydia Annually if high risk 08/08/2023  No recommendations at this time   Screening Mammogram    Mammogram     Recommend annually for all female patients aged 40 and older    One baseline mammogram covered for patients aged 35-39 11/02/2023    Health Maintenance   Topic Date Due   • Mammogram  11/02/2024       Immunizations    Influenza Covered once per flu season  Please get every year 09/01/2023  No recommendations at this time    Pneumococcal Each vaccine (Kbndfil96 & [id number]) covered once after 65 Prevnar 13: 11/12/2015    [id number]: 08/22/2021     No recommendations at this time    Hepatitis B One screening covered for patients with certain risk factors   -  No recommendations at this time    Tetanus Toxoid Not covered by Medicare Part B unless medically necessary (cut with metal); may be covered with your pharmacy prescription benefits -    Tetanus, Diptheria and Pertusis TD and TDaP Not covered by Medicare Part B -  No recommendations at this time    Zoster Not covered by Medicare Part B; may be covered with your pharmacy  prescription benefits 10/03/2015  Zoster Vaccines(2 of 3) due on 11/28/2015     Annual Monitoring of Persistent Medications (ACE/ARB, digoxin diuretics, anticonvulsants)    Potassium Annually Lab Results   Component Value Date    K 3.6 12/29/2023         Creatinine   Annually Lab Results   Component Value Date    CREATSERUM 0.94 12/29/2023         BUN Annually Lab Results   Component Value Date    BUN 18 12/29/2023       Drug Serum Conc Annually No results found for: \"DIGOXIN\", \"DIG\", \"VALP\"

## 2024-05-05 ENCOUNTER — LAB ENCOUNTER (OUTPATIENT)
Dept: LAB | Age: 69
End: 2024-05-05
Attending: INTERNAL MEDICINE
Payer: MEDICARE

## 2024-05-05 DIAGNOSIS — I10 ESSENTIAL HYPERTENSION: ICD-10-CM

## 2024-05-05 DIAGNOSIS — R73.01 IMPAIRED FASTING GLUCOSE: ICD-10-CM

## 2024-05-05 LAB
ALBUMIN SERPL-MCNC: 4.2 G/DL (ref 3.2–4.8)
ALBUMIN/GLOB SERPL: 1.3 {RATIO} (ref 1–2)
ALP LIVER SERPL-CCNC: 76 U/L
ALT SERPL-CCNC: 63 U/L
ANION GAP SERPL CALC-SCNC: 7 MMOL/L (ref 0–18)
AST SERPL-CCNC: 54 U/L (ref ?–34)
BASOPHILS # BLD AUTO: 0.02 X10(3) UL (ref 0–0.2)
BASOPHILS NFR BLD AUTO: 0.6 %
BILIRUB SERPL-MCNC: 1 MG/DL (ref 0.2–1.1)
BUN BLD-MCNC: 16 MG/DL (ref 9–23)
BUN/CREAT SERPL: 18.4 (ref 10–20)
CALCIUM BLD-MCNC: 9.4 MG/DL (ref 8.7–10.4)
CHLORIDE SERPL-SCNC: 104 MMOL/L (ref 98–112)
CHOLEST SERPL-MCNC: 129 MG/DL (ref ?–200)
CO2 SERPL-SCNC: 31 MMOL/L (ref 21–32)
CREAT BLD-MCNC: 0.87 MG/DL
DEPRECATED RDW RBC AUTO: 40.7 FL (ref 35.1–46.3)
EGFRCR SERPLBLD CKD-EPI 2021: 72 ML/MIN/1.73M2 (ref 60–?)
EOSINOPHIL # BLD AUTO: 0.12 X10(3) UL (ref 0–0.7)
EOSINOPHIL NFR BLD AUTO: 3.4 %
ERYTHROCYTE [DISTWIDTH] IN BLOOD BY AUTOMATED COUNT: 11.8 % (ref 11–15)
EST. AVERAGE GLUCOSE BLD GHB EST-MCNC: 120 MG/DL (ref 68–126)
FASTING PATIENT LIPID ANSWER: YES
FASTING STATUS PATIENT QL REPORTED: YES
GLOBULIN PLAS-MCNC: 3.3 G/DL (ref 2–3.5)
GLUCOSE BLD-MCNC: 113 MG/DL (ref 70–99)
HBA1C MFR BLD: 5.8 % (ref ?–5.7)
HCT VFR BLD AUTO: 45.2 %
HDLC SERPL-MCNC: 42 MG/DL (ref 40–59)
HGB BLD-MCNC: 15.1 G/DL
IMM GRANULOCYTES # BLD AUTO: 0.01 X10(3) UL (ref 0–1)
IMM GRANULOCYTES NFR BLD: 0.3 %
LDLC SERPL CALC-MCNC: 71 MG/DL (ref ?–100)
LYMPHOCYTES # BLD AUTO: 1.06 X10(3) UL (ref 1–4)
LYMPHOCYTES NFR BLD AUTO: 29.8 %
MCH RBC QN AUTO: 31.9 PG (ref 26–34)
MCHC RBC AUTO-ENTMCNC: 33.4 G/DL (ref 31–37)
MCV RBC AUTO: 95.4 FL
MONOCYTES # BLD AUTO: 0.47 X10(3) UL (ref 0.1–1)
MONOCYTES NFR BLD AUTO: 13.2 %
NEUTROPHILS # BLD AUTO: 1.88 X10 (3) UL (ref 1.5–7.7)
NEUTROPHILS # BLD AUTO: 1.88 X10(3) UL (ref 1.5–7.7)
NEUTROPHILS NFR BLD AUTO: 52.7 %
NONHDLC SERPL-MCNC: 87 MG/DL (ref ?–130)
OSMOLALITY SERPL CALC.SUM OF ELEC: 296 MOSM/KG (ref 275–295)
PLATELET # BLD AUTO: 143 10(3)UL (ref 150–450)
PLATELETS.RETICULATED NFR BLD AUTO: 6.3 % (ref 0–7)
POTASSIUM SERPL-SCNC: 4.1 MMOL/L (ref 3.5–5.1)
PROT SERPL-MCNC: 7.5 G/DL (ref 5.7–8.2)
RBC # BLD AUTO: 4.74 X10(6)UL
SODIUM SERPL-SCNC: 142 MMOL/L (ref 136–145)
TRIGL SERPL-MCNC: 83 MG/DL (ref 30–149)
TSI SER-ACNC: 2.39 MIU/ML (ref 0.55–4.78)
VLDLC SERPL CALC-MCNC: 13 MG/DL (ref 0–30)
WBC # BLD AUTO: 3.6 X10(3) UL (ref 4–11)

## 2024-05-05 PROCEDURE — 80053 COMPREHEN METABOLIC PANEL: CPT

## 2024-05-05 PROCEDURE — 84443 ASSAY THYROID STIM HORMONE: CPT

## 2024-05-05 PROCEDURE — 83036 HEMOGLOBIN GLYCOSYLATED A1C: CPT

## 2024-05-05 PROCEDURE — 36415 COLL VENOUS BLD VENIPUNCTURE: CPT

## 2024-05-05 PROCEDURE — 85025 COMPLETE CBC W/AUTO DIFF WBC: CPT

## 2024-05-05 PROCEDURE — 80061 LIPID PANEL: CPT

## 2024-05-06 DIAGNOSIS — D70.9 NEUTROPENIA, UNSPECIFIED TYPE (HCC): Primary | ICD-10-CM

## 2024-05-06 DIAGNOSIS — D69.6 THROMBOCYTOPENIA (HCC): ICD-10-CM

## 2024-06-13 ENCOUNTER — TELEPHONE (OUTPATIENT)
Dept: INTERNAL MEDICINE CLINIC | Facility: CLINIC | Age: 69
End: 2024-06-13

## 2024-06-20 NOTE — TELEPHONE ENCOUNTER
Medical Record Request Received    Date Received in Office: 6/13/24     Records Request Received From: [facility]     Date Sent to Scan Stat: 6/21/24    STAT Request?: no     Copy made for Provider & place in incoming folder: n/a    Service Dates Requested: 6/1/2019-present

## 2024-07-10 ENCOUNTER — LAB ENCOUNTER (OUTPATIENT)
Dept: LAB | Age: 69
End: 2024-07-10
Attending: INTERNAL MEDICINE
Payer: MEDICARE

## 2024-07-10 DIAGNOSIS — K76.0 NAFLD (NONALCOHOLIC FATTY LIVER DISEASE): Primary | ICD-10-CM

## 2024-07-10 LAB
ALBUMIN SERPL-MCNC: 4.3 G/DL (ref 3.2–4.8)
ALP LIVER SERPL-CCNC: 69 U/L
ALT SERPL-CCNC: 84 U/L
AST SERPL-CCNC: 72 U/L (ref ?–34)
BILIRUB DIRECT SERPL-MCNC: 0.4 MG/DL (ref ?–0.3)
BILIRUB SERPL-MCNC: 1 MG/DL (ref 0.2–1.1)
PROT SERPL-MCNC: 7.4 G/DL (ref 5.7–8.2)

## 2024-07-10 PROCEDURE — 36415 COLL VENOUS BLD VENIPUNCTURE: CPT

## 2024-07-10 PROCEDURE — 80076 HEPATIC FUNCTION PANEL: CPT

## 2024-09-14 DIAGNOSIS — I10 ESSENTIAL HYPERTENSION: ICD-10-CM

## 2024-09-15 DIAGNOSIS — I10 ESSENTIAL HYPERTENSION: ICD-10-CM

## 2024-09-16 RX ORDER — LISINOPRIL 40 MG/1
40 TABLET ORAL DAILY
Qty: 90 TABLET | Refills: 0 | Status: SHIPPED | OUTPATIENT
Start: 2024-09-16

## 2024-09-16 RX ORDER — METOPROLOL SUCCINATE 100 MG/1
100 TABLET, EXTENDED RELEASE ORAL DAILY
Qty: 90 TABLET | Refills: 1 | OUTPATIENT
Start: 2024-09-16

## 2024-09-16 RX ORDER — LISINOPRIL 40 MG/1
40 TABLET ORAL DAILY
Qty: 90 TABLET | Refills: 1 | OUTPATIENT
Start: 2024-09-16

## 2024-09-16 RX ORDER — METOPROLOL SUCCINATE 100 MG/1
100 TABLET, EXTENDED RELEASE ORAL DAILY
Qty: 90 TABLET | Refills: 0 | Status: SHIPPED | OUTPATIENT
Start: 2024-09-16

## 2024-09-16 RX ORDER — TRIAMTERENE/HYDROCHLOROTHIAZID 37.5-25 MG
1 TABLET ORAL DAILY
Qty: 90 TABLET | Refills: 1 | OUTPATIENT
Start: 2024-09-16

## 2024-09-16 RX ORDER — TRIAMTERENE/HYDROCHLOROTHIAZID 37.5-25 MG
1 TABLET ORAL DAILY
Qty: 90 TABLET | Refills: 0 | Status: SHIPPED | OUTPATIENT
Start: 2024-09-16

## 2024-10-01 ENCOUNTER — LAB ENCOUNTER (OUTPATIENT)
Dept: LAB | Age: 69
End: 2024-10-01
Attending: INTERNAL MEDICINE
Payer: MEDICARE

## 2024-10-01 DIAGNOSIS — D69.6 THROMBOCYTOPENIA (HCC): ICD-10-CM

## 2024-10-01 DIAGNOSIS — D70.9 NEUTROPENIA, UNSPECIFIED TYPE (HCC): ICD-10-CM

## 2024-10-01 LAB
BASOPHILS # BLD AUTO: 0.03 X10(3) UL (ref 0–0.2)
BASOPHILS NFR BLD AUTO: 0.7 %
DEPRECATED RDW RBC AUTO: 40.8 FL (ref 35.1–46.3)
EOSINOPHIL # BLD AUTO: 0.12 X10(3) UL (ref 0–0.7)
EOSINOPHIL NFR BLD AUTO: 2.8 %
ERYTHROCYTE [DISTWIDTH] IN BLOOD BY AUTOMATED COUNT: 11.8 % (ref 11–15)
HCT VFR BLD AUTO: 40.9 %
HGB BLD-MCNC: 14.5 G/DL
IMM GRANULOCYTES # BLD AUTO: 0.01 X10(3) UL (ref 0–1)
IMM GRANULOCYTES NFR BLD: 0.2 %
LYMPHOCYTES # BLD AUTO: 0.97 X10(3) UL (ref 1–4)
LYMPHOCYTES NFR BLD AUTO: 22.8 %
MCH RBC QN AUTO: 33.6 PG (ref 26–34)
MCHC RBC AUTO-ENTMCNC: 35.5 G/DL (ref 31–37)
MCV RBC AUTO: 94.9 FL
MONOCYTES # BLD AUTO: 0.58 X10(3) UL (ref 0.1–1)
MONOCYTES NFR BLD AUTO: 13.6 %
NEUTROPHILS # BLD AUTO: 2.55 X10 (3) UL (ref 1.5–7.7)
NEUTROPHILS # BLD AUTO: 2.55 X10(3) UL (ref 1.5–7.7)
NEUTROPHILS NFR BLD AUTO: 59.9 %
PLATELET # BLD AUTO: 142 10(3)UL (ref 150–450)
RBC # BLD AUTO: 4.31 X10(6)UL
WBC # BLD AUTO: 4.3 X10(3) UL (ref 4–11)

## 2024-10-01 PROCEDURE — 85025 COMPLETE CBC W/AUTO DIFF WBC: CPT

## 2024-10-01 PROCEDURE — 36415 COLL VENOUS BLD VENIPUNCTURE: CPT

## 2024-10-19 ENCOUNTER — LAB ENCOUNTER (OUTPATIENT)
Dept: LAB | Age: 69
End: 2024-10-19
Attending: INTERNAL MEDICINE
Payer: MEDICARE

## 2024-10-19 DIAGNOSIS — R93.1 ABNORMAL HEART SCORE CT: Primary | ICD-10-CM

## 2024-10-19 LAB
CHOLEST SERPL-MCNC: 105 MG/DL (ref ?–200)
FASTING PATIENT LIPID ANSWER: YES
HDLC SERPL-MCNC: 40 MG/DL (ref 40–59)
LDLC SERPL CALC-MCNC: 52 MG/DL (ref ?–100)
NONHDLC SERPL-MCNC: 65 MG/DL (ref ?–130)
TRIGL SERPL-MCNC: 59 MG/DL (ref 30–149)
VLDLC SERPL CALC-MCNC: 8 MG/DL (ref 0–30)

## 2024-10-19 PROCEDURE — 36415 COLL VENOUS BLD VENIPUNCTURE: CPT

## 2024-10-19 PROCEDURE — 80061 LIPID PANEL: CPT

## 2024-10-30 ENCOUNTER — OFFICE VISIT (OUTPATIENT)
Dept: INTERNAL MEDICINE CLINIC | Facility: CLINIC | Age: 69
End: 2024-10-30
Payer: MEDICARE

## 2024-10-30 VITALS
BODY MASS INDEX: 31.72 KG/M2 | OXYGEN SATURATION: 97 % | WEIGHT: 168 LBS | DIASTOLIC BLOOD PRESSURE: 70 MMHG | SYSTOLIC BLOOD PRESSURE: 110 MMHG | RESPIRATION RATE: 20 BRPM | HEIGHT: 61 IN | HEART RATE: 66 BPM | TEMPERATURE: 98 F

## 2024-10-30 DIAGNOSIS — D69.6 THROMBOCYTOPENIA (HCC): ICD-10-CM

## 2024-10-30 DIAGNOSIS — E66.811 CLASS 1 OBESITY DUE TO EXCESS CALORIES WITH SERIOUS COMORBIDITY AND BODY MASS INDEX (BMI) OF 32.0 TO 32.9 IN ADULT: ICD-10-CM

## 2024-10-30 DIAGNOSIS — K76.0 FATTY LIVER: ICD-10-CM

## 2024-10-30 DIAGNOSIS — I25.10 CORONARY ARTERY DISEASE INVOLVING NATIVE CORONARY ARTERY OF NATIVE HEART WITHOUT ANGINA PECTORIS: ICD-10-CM

## 2024-10-30 DIAGNOSIS — I77.9 BILATERAL CAROTID ARTERY DISEASE, UNSPECIFIED TYPE (HCC): ICD-10-CM

## 2024-10-30 DIAGNOSIS — E66.09 CLASS 1 OBESITY DUE TO EXCESS CALORIES WITH SERIOUS COMORBIDITY AND BODY MASS INDEX (BMI) OF 32.0 TO 32.9 IN ADULT: ICD-10-CM

## 2024-10-30 DIAGNOSIS — R73.01 IMPAIRED FASTING GLUCOSE: ICD-10-CM

## 2024-10-30 DIAGNOSIS — I10 ESSENTIAL HYPERTENSION: Primary | ICD-10-CM

## 2024-10-30 PROCEDURE — 1159F MED LIST DOCD IN RCRD: CPT | Performed by: INTERNAL MEDICINE

## 2024-10-30 PROCEDURE — G2211 COMPLEX E/M VISIT ADD ON: HCPCS | Performed by: INTERNAL MEDICINE

## 2024-10-30 PROCEDURE — 3078F DIAST BP <80 MM HG: CPT | Performed by: INTERNAL MEDICINE

## 2024-10-30 PROCEDURE — 3008F BODY MASS INDEX DOCD: CPT | Performed by: INTERNAL MEDICINE

## 2024-10-30 PROCEDURE — 3074F SYST BP LT 130 MM HG: CPT | Performed by: INTERNAL MEDICINE

## 2024-10-30 PROCEDURE — 1170F FXNL STATUS ASSESSED: CPT | Performed by: INTERNAL MEDICINE

## 2024-10-30 PROCEDURE — 99214 OFFICE O/P EST MOD 30 MIN: CPT | Performed by: INTERNAL MEDICINE

## 2024-10-30 RX ORDER — RESMETIROM 80 MG/1
80 TABLET, COATED ORAL DAILY
COMMUNITY
Start: 2024-05-30

## 2024-10-30 NOTE — PROGRESS NOTES
[facility]    CHIEF COMPLAINT:    Chief Complaint   Patient presents with    Medication Follow-Up     12/1/23 Colguard neg, 11/2/23 Mammo,          HISTORY OF PRESENT ILLNESS:  here for med check.   Htn: Taking meds regularly. No chest pain, no shortness of breath.      Cad: taking aspirin. Not on statin sec to fatty liver and elevated liver enzymes. No cardiac symptoms. Seeing dr. Estrella.      Fatty liver: seeing dr. García. He did not want her to be on a statin per pt. Stable labs.       Pre diabetes: working on diet and exercise. Has lost weight.     Bilateral carotid artery disease: not on statin at this time. No lightheadedness or dizziness.     Obesity: has lost weight since last visit. Walking more. Watching diet closely.    Axillary skin has been a bit more sensitive to the deodorants she uses. She has tried different ones. She has stopped using them at this time and feels better.      REVIEW OF SYSTEMS:  See HPI    Current Medications:    Current Outpatient Medications   Medication Sig Dispense Refill    REZDIFFRA 80 MG Oral Tab Take 80 mg by mouth daily.      lisinopril 40 MG Oral Tab Take 1 tablet (40 mg total) by mouth daily. 90 tablet 0    metoprolol succinate  MG Oral Tablet 24 Hr Take 1 tablet (100 mg total) by mouth daily. 90 tablet 0    Triamterene-HCTZ 37.5-25 MG Oral Tab Take 1 tablet by mouth daily. 90 tablet 0    ASPIRIN 81 OR Take by mouth daily.      vitamin E 400 UNITS Oral Cap Take 1 capsule (400 Units total) by mouth daily. Take 2 daily (800mg)      Multiple Vitamins-Minerals (MULTIVITAMIN OR) Take 1 tablet by mouth daily.           PAST MEDICAL, SOCIAL, AND FAMILY HISTORY:  Tobacco:    History   Smoking Status    Never   Smokeless Tobacco    Never       PHYSICAL EXAM:  /70 (BP Location: Left arm, Patient Position: Sitting, Cuff Size: large)   Pulse 66   Temp 97.8 °F (36.6 °C) (Temporal)   Resp 20   Ht 5' 1\" (1.549 m)   Wt 168 lb (76.2 kg)   LMP 10/14/2010    SpO2 97%   BMI 31.74 kg/m²    GENERAL: well developed, well nourished,in no apparent distress  LUNGS: clear to auscultation  CARDIO: RRR without murmur  GI: good BS's,no masses, HSM or tenderness  MUSCULOSKELETAL:  no edema, muscle strength normal.     DATA:  Results for orders placed or performed in visit on 10/19/24   Lipid Panel    Collection Time: 10/19/24  6:52 AM   Result Value Ref Range    Cholesterol, Total 105 <200 mg/dL    HDL Cholesterol 40 40 - 59 mg/dL    Triglycerides 59 30 - 149 mg/dL    LDL Cholesterol 52 <100 mg/dL    VLDL 8 0 - 30 mg/dL    Non HDL Chol 65 <130 mg/dL    Patient Fasting for Lipid? Yes             ASSESSMENT AND PLAN:  1. Essential hypertension  Continue meds.   - Comp Metabolic Panel (14); Future  - Lipid Panel; Future    2. Bilateral carotid artery disease, unspecified type (HCC)  Not on statin. No symptoms.     3. Impaired fasting glucose  Continue low carb. Regular exercise.   - Hemoglobin A1C; Future    4. Fatty liver  follow up with dr. García.     5. Class 1 obesity due to excess calories with serious comorbidity and body mass index (BMI) of 32.0 to 32.9 in adult  Continue to work on diet and exercise.     6. Coronary artery disease involving native coronary artery of native heart without angina pectoris  follow up with dr. Estrella.     7. Thrombocytopenia (HCC)  Recheck cbc in 3 months.   Labs also ordered for 6 months.   - CBC W Differential W Platelet [E]; Future  - CBC With Differential With Platelet; Future        Return in about 6 months (around 4/30/2025) for supervisit.      Kathryn Goodman MD

## 2024-11-04 ENCOUNTER — HOSPITAL ENCOUNTER (OUTPATIENT)
Dept: MAMMOGRAPHY | Age: 69
Discharge: HOME | End: 2024-11-04
Attending: INTERNAL MEDICINE
Payer: MEDICARE

## 2024-11-04 ENCOUNTER — HOSPITAL ENCOUNTER (OUTPATIENT)
Dept: ULTRASOUND IMAGING | Age: 69
Discharge: HOME | End: 2024-11-04
Attending: NURSE PRACTITIONER
Payer: MEDICARE

## 2024-11-04 DIAGNOSIS — Z12.31 ENCOUNTER FOR SCREENING MAMMOGRAM FOR MALIGNANT NEOPLASM OF BREAST: ICD-10-CM

## 2024-11-04 DIAGNOSIS — K75.81 NASH (NONALCOHOLIC STEATOHEPATITIS): ICD-10-CM

## 2024-11-04 PROCEDURE — 77067 SCR MAMMO BI INCL CAD: CPT | Performed by: INTERNAL MEDICINE

## 2024-11-04 PROCEDURE — 77063 BREAST TOMOSYNTHESIS BI: CPT | Performed by: INTERNAL MEDICINE

## 2024-11-04 PROCEDURE — 76705 ECHO EXAM OF ABDOMEN: CPT | Performed by: NURSE PRACTITIONER

## 2024-11-07 DIAGNOSIS — I10 ESSENTIAL HYPERTENSION: ICD-10-CM

## 2024-11-07 RX ORDER — METOPROLOL SUCCINATE 100 MG/1
100 TABLET, EXTENDED RELEASE ORAL DAILY
Qty: 90 TABLET | Refills: 0 | Status: SHIPPED | OUTPATIENT
Start: 2024-11-07

## 2024-11-07 RX ORDER — LISINOPRIL 40 MG/1
40 TABLET ORAL DAILY
Qty: 90 TABLET | Refills: 0 | Status: SHIPPED | OUTPATIENT
Start: 2024-11-07

## 2024-11-08 NOTE — TELEPHONE ENCOUNTER
Hypertension Medications Protocol [id number]/07/2024 05:53 AM   Protocol Details CMP or BMP in past 12 months    Last BP reading less than 140/90    In person appointment or virtual visit in the past 12 mos or appointment in next 3 mos    EGFRCR or GFRNAA > 50      1. Essential hypertension  Continue meds.   Future Appointments   Date Time Provider Department Center   4/28/2025  8:40 AM Kathryn Goodman MD [facility] [address] [place]

## 2024-12-07 DIAGNOSIS — I10 ESSENTIAL HYPERTENSION: ICD-10-CM

## 2024-12-09 RX ORDER — TRIAMTERENE AND HYDROCHLOROTHIAZIDE 37.5; 25 MG/1; MG/1
1 TABLET ORAL DAILY
Qty: 90 TABLET | Refills: 1 | Status: SHIPPED | OUTPATIENT
Start: 2024-12-09

## 2024-12-09 NOTE — TELEPHONE ENCOUNTER
Hypertension Medications Protocol [id number]/07/2024 05:45 AM   Protocol Details CMP or BMP in past 12 months    Last BP reading less than 140/90    In person appointment or virtual visit in the past 12 mos or appointment in next 3 mos    EGFRCR or GFRNAA > 50      1. Essential hypertension  Continue meds.    Future Appointments   Date Time Provider Department Center   4/28/2025  8:40 AM Kathryn Goodman MD [facility] [address] [place]

## 2025-01-25 ENCOUNTER — LAB ENCOUNTER (OUTPATIENT)
Dept: LAB | Age: 70
End: 2025-01-25
Attending: INTERNAL MEDICINE
Payer: MEDICARE

## 2025-01-25 DIAGNOSIS — D69.6 THROMBOCYTOPENIA: ICD-10-CM

## 2025-01-25 LAB
BASOPHILS # BLD AUTO: 0.02 X10(3) UL (ref 0–0.2)
BASOPHILS NFR BLD AUTO: 0.5 %
DEPRECATED RDW RBC AUTO: 39.5 FL (ref 35.1–46.3)
EOSINOPHIL # BLD AUTO: 0.09 X10(3) UL (ref 0–0.7)
EOSINOPHIL NFR BLD AUTO: 2.3 %
ERYTHROCYTE [DISTWIDTH] IN BLOOD BY AUTOMATED COUNT: 11.4 % (ref 11–15)
HCT VFR BLD AUTO: 39.9 %
HGB BLD-MCNC: 14.2 G/DL
IMM GRANULOCYTES # BLD AUTO: 0.01 X10(3) UL (ref 0–1)
IMM GRANULOCYTES NFR BLD: 0.3 %
LYMPHOCYTES # BLD AUTO: 0.81 X10(3) UL (ref 1–4)
LYMPHOCYTES NFR BLD AUTO: 20.4 %
MCH RBC QN AUTO: 33.7 PG (ref 26–34)
MCHC RBC AUTO-ENTMCNC: 35.6 G/DL (ref 31–37)
MCV RBC AUTO: 94.8 FL
MONOCYTES # BLD AUTO: 0.62 X10(3) UL (ref 0.1–1)
MONOCYTES NFR BLD AUTO: 15.6 %
NEUTROPHILS # BLD AUTO: 2.43 X10 (3) UL (ref 1.5–7.7)
NEUTROPHILS # BLD AUTO: 2.43 X10(3) UL (ref 1.5–7.7)
NEUTROPHILS NFR BLD AUTO: 60.9 %
PLATELET # BLD AUTO: 128 10(3)UL (ref 150–450)
PLATELETS.RETICULATED NFR BLD AUTO: 5.8 % (ref 0–7)
RBC # BLD AUTO: 4.21 X10(6)UL
WBC # BLD AUTO: 4 X10(3) UL (ref 4–11)

## 2025-01-25 PROCEDURE — 36415 COLL VENOUS BLD VENIPUNCTURE: CPT

## 2025-01-25 PROCEDURE — 85025 COMPLETE CBC W/AUTO DIFF WBC: CPT

## 2025-01-27 DIAGNOSIS — D69.6 THROMBOCYTOPENIA: Primary | ICD-10-CM

## 2025-01-28 ENCOUNTER — TELEPHONE (OUTPATIENT)
Age: 70
End: 2025-01-28

## 2025-02-05 DIAGNOSIS — I10 ESSENTIAL HYPERTENSION: ICD-10-CM

## 2025-02-06 RX ORDER — LISINOPRIL 40 MG/1
40 TABLET ORAL DAILY
Qty: 90 TABLET | Refills: 0 | Status: SHIPPED | OUTPATIENT
Start: 2025-02-06

## 2025-02-06 RX ORDER — METOPROLOL SUCCINATE 100 MG/1
100 TABLET, EXTENDED RELEASE ORAL DAILY
Qty: 90 TABLET | Refills: 0 | Status: SHIPPED | OUTPATIENT
Start: 2025-02-06

## 2025-02-21 ENCOUNTER — OFFICE VISIT (OUTPATIENT)
Age: 70
End: 2025-02-21
Attending: INTERNAL MEDICINE
Payer: MEDICARE

## 2025-02-21 VITALS
RESPIRATION RATE: 16 BRPM | HEIGHT: 60.95 IN | WEIGHT: 164.81 LBS | SYSTOLIC BLOOD PRESSURE: 128 MMHG | HEART RATE: 64 BPM | TEMPERATURE: 97 F | BODY MASS INDEX: 31.12 KG/M2 | DIASTOLIC BLOOD PRESSURE: 79 MMHG | OXYGEN SATURATION: 98 %

## 2025-02-21 DIAGNOSIS — D69.6 THROMBOCYTOPENIA: Primary | ICD-10-CM

## 2025-02-21 DIAGNOSIS — D72.810 LYMPHOPENIA: ICD-10-CM

## 2025-02-21 LAB
ALBUMIN SERPL-MCNC: 4.5 G/DL (ref 3.2–4.8)
ALBUMIN/GLOB SERPL: 1.3 {RATIO} (ref 1–2)
ALP LIVER SERPL-CCNC: 92 U/L
ALT SERPL-CCNC: 23 U/L
ANION GAP SERPL CALC-SCNC: 5 MMOL/L (ref 0–18)
AST SERPL-CCNC: 32 U/L (ref ?–34)
BASOPHILS # BLD AUTO: 0.04 X10(3) UL (ref 0–0.2)
BASOPHILS NFR BLD AUTO: 1.1 %
BILIRUB SERPL-MCNC: 1.1 MG/DL (ref 0.2–1.1)
BUN BLD-MCNC: 16 MG/DL (ref 9–23)
CALCIUM BLD-MCNC: 9.8 MG/DL (ref 8.7–10.6)
CHLORIDE SERPL-SCNC: 97 MMOL/L (ref 98–112)
CO2 SERPL-SCNC: 34 MMOL/L (ref 21–32)
CREAT BLD-MCNC: 0.85 MG/DL
DEPRECATED HBV CORE AB SER IA-ACNC: 614 NG/ML
EGFRCR SERPLBLD CKD-EPI 2021: 74 ML/MIN/1.73M2 (ref 60–?)
EOSINOPHIL # BLD AUTO: 0.11 X10(3) UL (ref 0–0.7)
EOSINOPHIL NFR BLD AUTO: 2.9 %
ERYTHROCYTE [DISTWIDTH] IN BLOOD BY AUTOMATED COUNT: 11.4 %
ERYTHROCYTE [SEDIMENTATION RATE] IN BLOOD: 48 MM/HR
FOLATE SERPL-MCNC: 40 NG/ML (ref 5.4–?)
GLOBULIN PLAS-MCNC: 3.4 G/DL (ref 2–3.5)
GLUCOSE BLD-MCNC: 100 MG/DL (ref 70–99)
HCT VFR BLD AUTO: 43.6 %
HGB BLD-MCNC: 15 G/DL
IMM GRANULOCYTES # BLD AUTO: 0.01 X10(3) UL (ref 0–1)
IMM GRANULOCYTES NFR BLD: 0.3 %
IRON SATN MFR SERPL: 34 %
IRON SERPL-MCNC: 111 UG/DL
LDH SERPL L TO P-CCNC: 198 U/L
LYMPHOCYTES # BLD AUTO: 0.94 X10(3) UL (ref 1–4)
LYMPHOCYTES NFR BLD AUTO: 24.7 %
MCH RBC QN AUTO: 32.3 PG (ref 26–34)
MCHC RBC AUTO-ENTMCNC: 34.4 G/DL (ref 31–37)
MCV RBC AUTO: 93.8 FL
MONOCYTES # BLD AUTO: 0.62 X10(3) UL (ref 0.1–1)
MONOCYTES NFR BLD AUTO: 16.3 %
NEUTROPHILS # BLD AUTO: 2.08 X10 (3) UL (ref 1.5–7.7)
NEUTROPHILS # BLD AUTO: 2.08 X10(3) UL (ref 1.5–7.7)
NEUTROPHILS NFR BLD AUTO: 54.7 %
OSMOLALITY SERPL CALC.SUM OF ELEC: 283 MOSM/KG (ref 275–295)
PLATELET # BLD AUTO: 155 10(3)UL (ref 150–450)
POTASSIUM SERPL-SCNC: 3.5 MMOL/L (ref 3.5–5.1)
PROT SERPL-MCNC: 7.9 G/DL (ref 5.7–8.2)
RBC # BLD AUTO: 4.65 X10(6)UL
SODIUM SERPL-SCNC: 136 MMOL/L (ref 136–145)
TOTAL IRON BINDING CAPACITY: 324 UG/DL (ref 250–425)
TRANSFERRIN SERPL-MCNC: 250 MG/DL (ref 250–380)
WBC # BLD AUTO: 3.8 X10(3) UL (ref 4–11)

## 2025-02-21 NOTE — PROGRESS NOTES
Education Record    Learner:  Patient    Disease / Diagnosis: abnormal labs       Barriers / Limitations:  None   Comments:    Method:  Discussion   Comments:    General Topics:  Plan of care reviewed   Comments:    Outcome:  Shows understanding   Comments:   Pt feeling well.   Denies abnormal bleeding or bruising. No trouble after a papercut with bleeding.

## 2025-02-21 NOTE — CONSULTS
Cancer Center Report of Consultation    Patient Name: Tessa Rosario   YOB: 1955   Medical Record Number: [id number]   CSN: [id number]   Consulting Physician: Celsa Braun MD  Referring Physician(s): Kathryn Goodman MD    Date of Consultation: 2025     Reason for Consultation:  Tessa Rosario was seen today in the Cancer Center for the diagnosis of Thrombocytopenia.    History of Present Illness:     69 year old female referred for thrombocytopenia. On chart review, thrombocytopenia noted since . US  showed fatty liver.  Has been on Rezdiffra for fatty liver since .  No herbal supplements.  No fevers, chills, night sweats or unexpected weight loss.  Does have some dry skin for which she has used some over-the-counter H2 blockers.    Past Medical History:  Past Medical History:    Abnormal mammogram    scattered fibroglandular densities    Abnormal mammogram, unspecified    BPPV (benign paroxysmal positional vertigo)    Cholecystitis with cholelithiasis    COVID    Disorder of liver    elevated liver functions    Dysuria    Elevated alkaline phosphatase level    High blood pressure    HTN (hypertension)    Lung nodule    Obesity    Strabismus    Unspecified essential hypertension    Visual impairment    prescription glasses    Vitamin D deficiency    Vulvovaginitis       Past Surgical History:  Past Surgical History:   Procedure Laterality Date    Cholecystectomy      Hernia surgery      repair double hernia    Kaylen biopsy stereo nodule 1 site right (cpt=19081)  2014      1982    Other surgical history  2018    liver biopsy.  Fatty liver       Family Medical History:  Family History   Problem Relation Age of Onset    Other (Other) Father         ETOH, kidney failure    Obesity Father          Renal failure    Hypertension Mother          Stroke    Other (stroke) Mother     Other (htn) Mother     Stroke Mother             Diabetes Maternal  Grandmother             Other (diabetes) Maternal Grandmother 40    Obesity Sister          chronic obstructive pulmonary disease/stroke heart failure gallbladder       Psychosocial History:  Social History     Socioeconomic History    Marital status:      Spouse name: Not on file    Number of children: Not on file    Years of education: Not on file    Highest education level: Not on file   Occupational History    Occupation: office work   Tobacco Use    Smoking status: Never    Smokeless tobacco: Never   Vaping Use    Vaping status: Never Used   Substance and Sexual Activity    Alcohol use: No     Alcohol/week: 0.0 standard drinks of alcohol    Drug use: No    Sexual activity: Not on file   Other Topics Concern     Service Not Asked    Blood Transfusions Not Asked    Caffeine Concern Yes     Comment: 1-2 cups per day    Occupational Exposure Not Asked    Hobby Hazards Not Asked    Sleep Concern Not Asked    Stress Concern No    Weight Concern No    Special Diet No    Back Care Not Asked    Exercise Yes     Comment: limited to walking /stair climbing    Bike Helmet Not Asked    Seat Belt Yes    Self-Exams Not Asked   Social History Narrative    Not on file     Social Drivers of Health     Food Insecurity: Not on file   Transportation Needs: Not on file   Housing Stability: Not on file     , 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   Allergies:   Allergies[1]    Current Medications:    Current Outpatient Medications:     LISINOPRIL 40 MG Oral Tab, TAKE 1 TABLET(40 MG) BY MOUTH DAILY, Disp: 90 tablet, Rfl: 0    METOPROLOL SUCCINATE  MG Oral Tablet 24 Hr, TAKE 1 TABLET(100 MG) BY MOUTH DAILY, Disp: 90 tablet, Rfl: 0    Triamterene-HCTZ 37.5-25 MG Oral Tab, TAKE 1 TABLET BY MOUTH DAILY, Disp: 90 tablet, Rfl: 1    REZDIFFRA 80 MG Oral Tab, Take 80 mg by mouth daily., Disp: , Rfl:     ASPIRIN 81 OR, Take by mouth daily., Disp: , Rfl:     vitamin E 400 UNITS Oral Cap,  Take 1 capsule (400 Units total) by mouth daily. Take 2 daily (800mg), Disp: , Rfl:     Multiple Vitamins-Minerals (MULTIVITAMIN OR), Take 1 tablet by mouth daily.  , Disp: , Rfl:     Review of Systems:    Constitutional: No chills, fevers, malaise, night sweats and weight loss.   Eyes: No visual disturbance, irritation and redness.  Ears, nose, mouth, throat, and face: No hearing loss, tinnitus, hoarseness and voice change.  Respiratory: No cough, sputum, hemoptysis, chest pain, wheezing, dyspnea on exertion  Cardiovascular: No chest pain, palpitations, syncope,orthopnea, PND, edema  Gastrointestinal:No dysphagia, odynophagia, nausea, vomiting, diarrhea, abdominal pain.  Derm: Skin rash behind neck  Hematologic/lymphatic: No easy bruising, bleeding, and lymphadenopathy.  Musculoskeletal: No myalgias, arthralgias, muscle weakness.  Neurological: No headaches, dizziness, seizures, speech problems, gait problems       Vital Signs:  /79 (BP Location: Left arm, Patient Position: Sitting, Cuff Size: large)   Pulse 64   Temp 97.2 °F (36.2 °C) (Tympanic)   Resp 16   Ht 1.548 m (5' 0.95\")   Wt 74.8 kg (164 lb 12.8 oz)   LMP 10/14/2010   SpO2 98%   BMI 31.19 kg/m²     ECOG PS: 0    Physical Examination:    General: Patient is alert and oriented x 3, not in acute distress.  Neck: No lymphadenopathy.   Lymphatics: There is no palpable lymphadenopathy  in the cervical, axillary, or inguinal regions.  Chest: Clear to auscultation.  Heart: Regular rate and rhythm. S1S2 normal.  Abdomen: Soft, non tender with good bowel sounds.  No hepatosplenomegaly/mass.    Extremities: Pedal pulses are present. No edema.  Neurological: Grossly intact.      Labs:    Lab Results   Component Value Date    WBC 3.8 02/21/2025    HGB 15.0 02/21/2025    HCT 43.6 02/21/2025    .0 02/21/2025       Assessment and Plan:    # Thrombocytopenia: Mild, noted since 5/24.  Today platelet count is stable.  Will complete workup.  Could have  some splenic sequestration due to fatty liver.  Will rule out autoimmune etiologies.  Will contact patient when labs are back.  If thrombocytopenia gets worse in the future, we may need to consider bone marrow biopsy to rule out MDS.    # Leukopenia: Mild, noted on and off since 2017 with mildly low lymphocyte count.  Unlikely to be of concern.    Will contact her when labs are back.          Procedures    CBC With Differential With Platelet    Comp Metabolic Panel (14)    LDH    Monoclonal Protein Study    Ferritin    Iron And Tibc    Sed Rate, Westergren (Automated)    Folic Acid Serum (Folate)    Connective Tissue Disease (MIREYA) Screen    Platelet Count on Citrated Bld    SERUM MONOCLONAL PROTEIN STUDY       Dorie Braun M.D.    [facility]    [facility] [place]  [address] [place], [address]    2/21/2025           [1]   Allergies  Allergen Reactions    Aldomet [Methyldopa] HIVES    Methyldopa OTHER (SEE COMMENTS)

## 2025-02-25 LAB
DSDNA IGG SERPL IA-ACNC: 5.4 IU/ML
ENA AB SER QL IA: 0.3 UG/L
ENA AB SER QL IA: NEGATIVE

## 2025-02-26 LAB
ALBUMIN SERPL ELPH-MCNC: 3.79 G/DL (ref 3.75–5.21)
ALBUMIN/GLOB SERPL: 1.15 {RATIO} (ref 1–2)
ALPHA1 GLOB SERPL ELPH-MCNC: 0.31 G/DL (ref 0.19–0.46)
ALPHA2 GLOB SERPL ELPH-MCNC: 0.8 G/DL (ref 0.48–1.05)
B-GLOBULIN SERPL ELPH-MCNC: 0.89 G/DL (ref 0.68–1.23)
GAMMA GLOB SERPL ELPH-MCNC: 1.31 G/DL (ref 0.62–1.7)
IGA SERPL-MCNC: 368.3 MG/DL (ref 40–350)
IGM SERPL-MCNC: 214.8 MG/DL (ref 50–300)
IMMUNOGLOBULIN PNL SER-MCNC: 1293 MG/DL (ref 650–1600)
KAPPA LC FREE SER-MCNC: 2.72 MG/DL (ref 0.33–1.94)
KAPPA LC FREE/LAMBDA FREE SER NEPH: 1.38 {RATIO} (ref 0.26–1.65)
LAMBDA LC FREE SERPL-MCNC: 1.98 MG/DL (ref 0.57–2.63)
PROT SERPL-MCNC: 7.1 G/DL (ref 5.7–8.2)

## 2025-02-27 ENCOUNTER — PATIENT MESSAGE (OUTPATIENT)
Dept: INTERNAL MEDICINE CLINIC | Facility: CLINIC | Age: 70
End: 2025-02-27

## 2025-03-01 NOTE — TELEPHONE ENCOUNTER
Cmp was also done. Okay to cancel cbc and cmp. She should do the lipid panel and A1c prior to her visit with me in April.

## 2025-04-01 ENCOUNTER — OFFICE VISIT (OUTPATIENT)
Dept: INTERNAL MEDICINE CLINIC | Facility: CLINIC | Age: 70
End: 2025-04-01
Payer: MEDICARE

## 2025-04-01 ENCOUNTER — HOSPITAL ENCOUNTER (OUTPATIENT)
Dept: GENERAL RADIOLOGY | Age: 70
Discharge: HOME | End: 2025-04-01
Attending: NURSE PRACTITIONER
Payer: MEDICARE

## 2025-04-01 VITALS
BODY MASS INDEX: 30.74 KG/M2 | HEART RATE: 64 BPM | WEIGHT: 162.81 LBS | RESPIRATION RATE: 16 BRPM | SYSTOLIC BLOOD PRESSURE: 122 MMHG | TEMPERATURE: 98 F | HEIGHT: 60.95 IN | DIASTOLIC BLOOD PRESSURE: 80 MMHG | OXYGEN SATURATION: 98 %

## 2025-04-01 DIAGNOSIS — R05.8 PRODUCTIVE COUGH: ICD-10-CM

## 2025-04-01 DIAGNOSIS — R05.8 PRODUCTIVE COUGH: Primary | ICD-10-CM

## 2025-04-01 PROCEDURE — 1170F FXNL STATUS ASSESSED: CPT | Performed by: NURSE PRACTITIONER

## 2025-04-01 PROCEDURE — 3079F DIAST BP 80-89 MM HG: CPT | Performed by: NURSE PRACTITIONER

## 2025-04-01 PROCEDURE — 71046 X-RAY EXAM CHEST 2 VIEWS: CPT | Performed by: NURSE PRACTITIONER

## 2025-04-01 PROCEDURE — 3074F SYST BP LT 130 MM HG: CPT | Performed by: NURSE PRACTITIONER

## 2025-04-01 PROCEDURE — 1159F MED LIST DOCD IN RCRD: CPT | Performed by: NURSE PRACTITIONER

## 2025-04-01 PROCEDURE — G2211 COMPLEX E/M VISIT ADD ON: HCPCS | Performed by: NURSE PRACTITIONER

## 2025-04-01 PROCEDURE — 3008F BODY MASS INDEX DOCD: CPT | Performed by: NURSE PRACTITIONER

## 2025-04-01 PROCEDURE — 99214 OFFICE O/P EST MOD 30 MIN: CPT | Performed by: NURSE PRACTITIONER

## 2025-04-01 RX ORDER — PREDNISONE 20 MG/1
40 TABLET ORAL DAILY
Qty: 10 TABLET | Refills: 0 | Status: SHIPPED | OUTPATIENT
Start: 2025-04-01 | End: 2025-04-06

## 2025-04-01 RX ORDER — BENZONATATE 200 MG/1
200 CAPSULE ORAL 3 TIMES DAILY PRN
Qty: 30 CAPSULE | Refills: 0 | Status: SHIPPED | OUTPATIENT
Start: 2025-04-01

## 2025-04-01 RX ORDER — ALBUTEROL SULFATE 90 UG/1
2 INHALANT RESPIRATORY (INHALATION) EVERY 6 HOURS PRN
Qty: 1 EACH | Refills: 0 | Status: SHIPPED | OUTPATIENT
Start: 2025-04-01

## 2025-04-01 NOTE — PROGRESS NOTES
The following individual(s) verbally consented to be recorded using ambient AI listening technology and understand that they can each withdraw their consent to this listening technology at any point by asking the clinician to turn off or pause the recording:    Patient name: Tessa Rosario    CHIEF COMPLAINT:     Chief Complaint   Patient presents with    Follow - Up     Bronchitis - 4 weeks ago, pt states she did start to feel better (not all the way) but then took a turn for the worse and feels it's all coming back, especially in her chest    As of Today Complains of:  Headaches, Both Ears are Clogged/Ringing, Sore Throat, Sinus Congestion/Drainage/Pain above Left Eyebrow, Cough - Productive, Chest Congestion/Heaviness, SOB with Activity, Fatigue       HPI:     History of Present Illness  Ms. Tessa Rosario is a 69 year old female who presents with persistent upper respiratory symptoms.     She has been experiencing persistent upper respiratory symptoms for approximately four weeks ago towards the end of February. Thinks she has has bronchitis, she describes a 'bronchial cough' that was severe, accompanied by significant phlegm production. Although she initially felt improvement, she experienced a recurrence of symptoms last night, including chest heaviness and persistent phlegm.    The phlegm never completely resolved, and she has been experiencing a 'white noise' in her ears, raising concerns about a possible ear infection. However, she denies any ear pain. She also reports nasal congestion and a runny nose this morning, but denies fever, chills, sore throat, nausea, vomiting, or diarrhea. She experiences mild shortness of breath when speaking during coughing fits, which she attributes to the phlegm, and describes her throat as dry. No wheezing or chest pain.     Regarding medication, she previously used Mucinex DM for bronchitis but discontinued it due to jitteriness. She is not currently taking  any medication for her symptoms.      Past Medical History:    Abnormal mammogram    scattered fibroglandular densities    Abnormal mammogram, unspecified    Atherosclerosis of coronary artery    BPPV (benign paroxysmal positional vertigo)    Cholecystitis with cholelithiasis    COVID    Disorder of liver    elevated liver functions    Dysuria    Elevated alkaline phosphatase level    High blood pressure    HTN (hypertension)    Lung nodule    Obesity    Strabismus    Unspecified essential hypertension    Visual impairment    prescription glasses    Vitamin D deficiency    Vulvovaginitis      Past Surgical History:   Procedure Laterality Date    Cataract      Not significant as of now    Cholecystectomy      Hernia surgery      repair double hernia    Kaylen biopsy stereo nodule 1 site right (cpt=19081)  2014      1982    Other surgical history  2018    liver biopsy.  Fatty liver      Social History:  Social History     Socioeconomic History    Marital status:    Occupational History    Occupation: office work   Tobacco Use    Smoking status: Never     Passive exposure: Never    Smokeless tobacco: Never   Vaping Use    Vaping status: Never Used   Substance and Sexual Activity    Alcohol use: No    Drug use: Never   Other Topics Concern    Caffeine Concern Yes     Comment: 1 cup per day    Stress Concern No    Weight Concern No    Special Diet No    Exercise Yes     Comment: limited to walking /stair climbing    Seat Belt Yes      Family History:  Family History   Problem Relation Age of Onset    Other (Other) Father         ETOH, kidney failure    Obesity Father          Renal failure    Hypertension Mother          Stroke    Other (stroke) Mother     Other (htn) Mother     Stroke Mother             Diabetes Maternal Grandmother             Other (diabetes) Maternal Grandmother 40    Obesity Sister          chronic obstructive pulmonary disease/stroke  heart failure gallbladder      Allergies:  Allergies[1]   Current Meds:  Current Outpatient Medications   Medication Sig Dispense Refill    predniSONE 20 MG Oral Tab Take 2 tablets (40 mg total) by mouth daily for 5 days. 10 tablet 0    benzonatate 200 MG Oral Cap Take 1 capsule (200 mg total) by mouth 3 (three) times daily as needed. 30 capsule 0    albuterol 108 (90 Base) MCG/ACT Inhalation Aero Soln Inhale 2 puffs into the lungs every 6 (six) hours as needed. 1 each 0    LISINOPRIL 40 MG Oral Tab TAKE 1 TABLET(40 MG) BY MOUTH DAILY 90 tablet 0    METOPROLOL SUCCINATE  MG Oral Tablet 24 Hr TAKE 1 TABLET(100 MG) BY MOUTH DAILY 90 tablet 0    Triamterene-HCTZ 37.5-25 MG Oral Tab TAKE 1 TABLET BY MOUTH DAILY 90 tablet 1    REZDIFFRA 80 MG Oral Tab Take 80 mg by mouth daily.      ASPIRIN 81 OR Take by mouth daily.      vitamin E 400 UNITS Oral Cap Take 1 capsule (400 Units total) by mouth daily. Take 2 daily (800mg)      Multiple Vitamins-Minerals (MULTIVITAMIN OR) Take 1 tablet by mouth daily.           Counseling given: Not Answered       REVIEW OF SYSTEMS:   See HPI.    EXAM:     /80 (BP Location: Left arm, Patient Position: Sitting, Cuff Size: large)   Pulse 64   Temp 97.6 °F (36.4 °C) (Temporal)   Resp 16   Ht 5' 0.95\" (1.548 m)   Wt 162 lb 12.8 oz (73.8 kg)   LMP 10/14/2010   SpO2 98%   BMI 30.81 kg/m²   Body mass index is 30.81 kg/m².   Vital signs reviewed. Appears stated age, well groomed, in no acute distress.  Physical Exam:  GENERAL: Patient is alert, awake and oriented, well developed, well nourished.  HEENT: Head: Normocephalic, atraumatic. Eyes: EOMI, PERRLA, conjunctivae clear bilaterally, no eye discharge. Ears: External normal, TM clear bilaterally. Throat: No tonsillar erythema or exudate.   NECK: Supple, no CLAD, no thyromegaly.  HEART: RRR without murmur.  LUNGS: Clear to auscultation bilaterally, no rales/rhonchi/wheezing.  ABDOMEN: good BS's, no masses, HSM or  tenderness  NEURO: Oriented time three.     LABS:      Lab Results   Component Value Date    WBC 3.8 (L) 02/21/2025    RBC 4.65 02/21/2025    HGB 15.0 02/21/2025    HCT 43.6 02/21/2025    MCV 93.8 02/21/2025    MCH 32.3 02/21/2025    MCHC 34.4 02/21/2025    RDW 11.4 02/21/2025    .0 02/21/2025    MPV 9.7 07/14/2012      Lab Results   Component Value Date     (H) 02/21/2025    BUN 16 02/21/2025    BUNCREA 18.4 05/05/2024    CREATSERUM 0.85 02/21/2025    ANIONGAP 5 02/21/2025    GFR 82 01/30/2018    GFRNAA 70 10/30/2021    GFRAA 81 10/30/2021    CA 9.8 02/21/2025    OSMOCALC 283 02/21/2025    ALKPHO 92 02/21/2025    AST 32 02/21/2025    ALT 23 02/21/2025    BILT 1.1 02/21/2025    TP 7.9 02/21/2025    TP 7.1 02/21/2025    ALB 4.5 02/21/2025    ALB 3.79 02/21/2025    GLOBULIN 3.4 02/21/2025    AGRATIO 1.4 01/30/2016     02/21/2025    K 3.5 02/21/2025    CL 97 (L) 02/21/2025    CO2 34.0 (H) 02/21/2025      Lab Results   Component Value Date    CHOLEST 105 10/19/2024    TRIG 59 10/19/2024    HDL 40 10/19/2024    LDL 52 10/19/2024    VLDL 8 10/19/2024    TCHDLRATIO 3.38 07/13/2017    NONHDLC 65 10/19/2024      Lab Results   Component Value Date    TSH 2.387 05/05/2024      Lab Results   Component Value Date     05/05/2024    A1C 5.8 (H) 05/05/2024        IMAGING:     No results found.     ASSESSMENT AND PLAN:     Assessment & Plan  1. Productive cough  She has experienced upper respiratory symptoms for four weeks started in late February. Symptoms include a persistent bronchial cough with phlegm, chest heaviness, and intermittent dyspnea. The cough and phlegm have improved but persist. There is no fever, chills, or wheezing, and lung auscultation is clear. A chest x-ray is necessary to rule out pneumonia. Treatment with oral steroids is indicated to address inflammation and throat irritation from coughing. Prednisone is chosen for its anti-inflammatory efficacy. Benzonatate pearls and an  albuterol inhaler are prescribed for symptomatic relief of cough and dyspnea, respectively.  - Order chest x-ray to rule out pneumonia.  - Prescribe prednisone 5-day course, 2 pills once daily with food to reduce inflammation.  - Prescribe benzonatate pearls for cough, up to three times a day as needed.  - Prescribe albuterol inhaler, 2 puffs every 6-8 hours as needed for dyspnea.  - XR CHEST PA + LAT CHEST (CPT=71046); Future  - predniSONE 20 MG Oral Tab; Take 2 tablets (40 mg total) by mouth daily for 5 days.  Dispense: 10 tablet; Refill: 0  - benzonatate 200 MG Oral Cap; Take 1 capsule (200 mg total) by mouth 3 (three) times daily as needed.  Dispense: 30 capsule; Refill: 0  - albuterol 108 (90 Base) MCG/ACT Inhalation Aero Soln; Inhale 2 puffs into the lungs every 6 (six) hours as needed.  Dispense: 1 each; Refill: 0    The patient indicates understanding of these issues and agrees to the plan.  Return if symptoms worsen or fail to improve.    Genia Fuentes, APRN  4/1/2025         [1]   Allergies  Allergen Reactions    Aldomet [Methyldopa] HIVES    Methyldopa OTHER (SEE COMMENTS)

## 2025-04-24 ENCOUNTER — LAB ENCOUNTER (OUTPATIENT)
Dept: LAB | Age: 70
End: 2025-04-24
Attending: INTERNAL MEDICINE
Payer: MEDICARE

## 2025-04-24 DIAGNOSIS — R73.01 IMPAIRED FASTING GLUCOSE: ICD-10-CM

## 2025-04-24 DIAGNOSIS — I10 ESSENTIAL HYPERTENSION: ICD-10-CM

## 2025-04-24 LAB
CHOLEST SERPL-MCNC: 109 MG/DL (ref ?–200)
EST. AVERAGE GLUCOSE BLD GHB EST-MCNC: 111 MG/DL (ref 68–126)
FASTING PATIENT LIPID ANSWER: YES
HBA1C MFR BLD: 5.5 % (ref ?–5.7)
HDLC SERPL-MCNC: 46 MG/DL (ref 40–59)
LDLC SERPL CALC-MCNC: 51 MG/DL (ref ?–100)
NONHDLC SERPL-MCNC: 63 MG/DL (ref ?–130)
TRIGL SERPL-MCNC: 48 MG/DL (ref 30–149)
VLDLC SERPL CALC-MCNC: 7 MG/DL (ref 0–30)

## 2025-04-24 PROCEDURE — 80061 LIPID PANEL: CPT

## 2025-04-24 PROCEDURE — 36415 COLL VENOUS BLD VENIPUNCTURE: CPT

## 2025-04-24 PROCEDURE — 83036 HEMOGLOBIN GLYCOSYLATED A1C: CPT

## 2025-04-28 ENCOUNTER — OFFICE VISIT (OUTPATIENT)
Dept: INTERNAL MEDICINE CLINIC | Facility: CLINIC | Age: 70
End: 2025-04-28
Payer: MEDICARE

## 2025-04-28 VITALS
BODY MASS INDEX: 31.03 KG/M2 | HEIGHT: 60.95 IN | SYSTOLIC BLOOD PRESSURE: 118 MMHG | DIASTOLIC BLOOD PRESSURE: 78 MMHG | WEIGHT: 164.38 LBS | HEART RATE: 64 BPM | TEMPERATURE: 97 F | RESPIRATION RATE: 16 BRPM

## 2025-04-28 DIAGNOSIS — Z53.09 CONTRAINDICATION TO STATIN MEDICATION: ICD-10-CM

## 2025-04-28 DIAGNOSIS — K76.0 FATTY LIVER: ICD-10-CM

## 2025-04-28 DIAGNOSIS — Z78.0 POSTMENOPAUSAL: ICD-10-CM

## 2025-04-28 DIAGNOSIS — L29.9 ITCHING: ICD-10-CM

## 2025-04-28 DIAGNOSIS — R91.1 LUNG NODULE: ICD-10-CM

## 2025-04-28 DIAGNOSIS — Z12.31 ENCOUNTER FOR SCREENING MAMMOGRAM FOR MALIGNANT NEOPLASM OF BREAST: ICD-10-CM

## 2025-04-28 DIAGNOSIS — N28.89 RENAL MASS: ICD-10-CM

## 2025-04-28 DIAGNOSIS — I25.10 CORONARY ARTERY DISEASE INVOLVING NATIVE CORONARY ARTERY OF NATIVE HEART WITHOUT ANGINA PECTORIS: ICD-10-CM

## 2025-04-28 DIAGNOSIS — Z00.00 ENCOUNTER FOR ANNUAL HEALTH EXAMINATION: Primary | ICD-10-CM

## 2025-04-28 DIAGNOSIS — E66.09 CLASS 1 OBESITY DUE TO EXCESS CALORIES WITH SERIOUS COMORBIDITY AND BODY MASS INDEX (BMI) OF 32.0 TO 32.9 IN ADULT: ICD-10-CM

## 2025-04-28 DIAGNOSIS — I77.9 BILATERAL CAROTID ARTERY DISEASE, UNSPECIFIED TYPE: ICD-10-CM

## 2025-04-28 DIAGNOSIS — R73.01 IMPAIRED FASTING GLUCOSE: ICD-10-CM

## 2025-04-28 DIAGNOSIS — I10 ESSENTIAL HYPERTENSION: ICD-10-CM

## 2025-04-28 DIAGNOSIS — E66.811 CLASS 1 OBESITY DUE TO EXCESS CALORIES WITH SERIOUS COMORBIDITY AND BODY MASS INDEX (BMI) OF 32.0 TO 32.9 IN ADULT: ICD-10-CM

## 2025-04-28 DIAGNOSIS — L30.9 DERMATITIS: ICD-10-CM

## 2025-04-28 NOTE — PROGRESS NOTES
Subjective:   Tessa Rosario is a 69 year old female who presents for a MA AHA (Medicare Advantage Annual Health Assessment) and Medicare Subsequent Annual Wellness visit (Pt already had Initial Annual Wellness) and scheduled follow up of multiple significant but stable problems.          The following individual(s) verbally consented to be recorded using ambient AI listening technology and understand that they can each withdraw their consent to this listening technology at any point by asking the clinician to turn off or pause the recording:    Patient name: Tessa Rosario  Additional names:  none       mammo due 11/2025. Ordered.   Declines pelvic exam today. Breast exam done.   cologuard done 11/2023 was negative.   dexa done 2021 was normal. Ordered.      Up to date tdap, rsv.   Up to date prevnar, pneumovax.   She will get shingrix, covid booster (last 9/2024) at her local pharmacy.      AHA flowsheet reviewed.   No falls.   Memory testing normal.   Mood has been stable. No depression.   Seeing eye doctor yearly.    History of Present Illness  She has a history of fatty liver disease and experiences mild pain associated with this condition.  She has been actively working on weight loss, reducing her weight from 168 pounds in October to 164 pounds currently, attributing this to dietary changes aimed at improving her liver health. Liver tests in March were normal.    She thinks she may have a rash on her back since mid-2023, which later appeared on her scalp in November 2024. The her back and the back of the head are itchy, and she uses dandruff shampoo for temporary relief lasting about six hours. Her  has not observed any visible rash.  She has been on Rezdiffra for a year and questions if it might be contributing to her symptoms.    She is not currently on a statin, and her recent hemoglobin A1c was normal, indicating improvement from a prediabetic state. Her cholesterol levels are reported to  be excellent. A CBC in February showed a slightly low white blood cell count, which has since normalized. An abdominal ultrasound noted an issue with the right kidney, which has not been addressed yet.    She is up to date on tetanus, RSV, and pneumonia vaccinations, but still needs shingles vaccines and is eligible for another COVID booster. She engages in moderate exercise, including daily walks, and is able to perform daily activities without assistance. No urinary leakage, no depression, and her memory testing was fine.    History/Other:   Fall Risk Assessment:   She has been screened for Falls and is low risk.      Cognitive Assessment:   She had a completely normal cognitive assessment - see flowsheet entries       Functional Ability/Status:   Tessa Rosario has some abnormal functions as listed below:  She has Vision problems based on screening of functional status.       Depression Screening (PHQ):  PHQ-2 SCORE: 0  , done 4/25/2025   Last [place] Suicide Screening on 4/28/2025 was No Risk.       Advanced Directives:   She does have a Living Will but we do NOT have it on file in Epic.    She does NOT have a Power of  for Health Care. [Do you have a healthcare power of ?: (Patient-Rptd) No]  Discussed with patient and provided information.        Problem List[1]  Allergies:  She is allergic to aldomet [methyldopa] and methyldopa.    Current Medications:  Active Meds, Sig Only[2]    Medical History:  She  has a past medical history of Abnormal mammogram (01/05/2012), Abnormal mammogram, unspecified (06/29/2012), Atherosclerosis of coronary artery, BPPV (benign paroxysmal positional vertigo), Cholecystitis with cholelithiasis (03/27/2014), COVID, Disorder of liver, Dysuria, Elevated alkaline phosphatase level (12/09/2013), High blood pressure, HTN (hypertension), Lung nodule (12/09/2013), Obesity, Strabismus, Unspecified essential hypertension (06/29/2012), Visual impairment, Vitamin D  deficiency (2013), and Vulvovaginitis.  Surgical History:  She  has a past surgical history that includes hernia surgery; cholecystectomy; other surgical history (2018);  (1982); yves biopsy stereo nodule 1 site right (cpt=19081) (2014); and cataract ().   Family History:  Her family history includes Diabetes in her maternal grandmother; Hypertension in her mother; Obesity in her father and sister; Other in her father; Stroke in her mother; diabetes (age of onset: 40) in her maternal grandmother; htn in her mother; stroke in her mother.  Social History:  She  reports that she has never smoked. She has never been exposed to tobacco smoke. She has never used smokeless tobacco. She reports that she does not drink alcohol and does not use drugs.    Tobacco:  She has never smoked tobacco.    CAGE Alcohol Screen:   CAGE screening score of 0 on 2025, showing low risk of alcohol abuse.      Patient Care Team:  Kathryn Goodman MD as PCP - General (Internal Medicine)  Ammon Estrella MD (CARDIOLOGY)    Review of Systems  GENERAL: feels well otherwise  SKIN: denies any unusual skin lesions  EYES:denies blurred vision or double vision  HEENT: denies nasal congestion, sinus pain or ST  LUNGS: denies shortness of breath with exertion  CARDIOVASCULAR: denies chest pain on exertion  GI: denies abdominal pain,denies heartburn  : denies dysuria, vaginal discharge or itching  MUSCULOSKELETAL: denies back pain  NEURO: denies headaches  PSYCHE: denies depression or anxiety  HEMATOLOGIC: denies hx of anemia  ENDOCRINE: denies thyroid history  ALL/ASTHMA: denies hx of allergy or asthma     Objective:   Physical Exam  General Appearance:  Alert, cooperative, no distress, appears stated age   Head:  Normocephalic, without obvious abnormality, atraumatic   Eyes:  PERRL, conjunctiva/corneas clear, EOM's intact both eyes   Ears:  Normal TM's and external ear canals, both ears   Nose: Nares normal, septum  midline,mucosa normal, no drainage or sinus tenderness   Throat: Lips, mucosa, and tongue normal; teeth and gums normal   Neck: Supple, symmetrical, trachea midline, no adenopathy;  thyroid: not enlarged, symmetric, no tenderness/mass/nodules; no carotid bruit or JVD   Back:   Symmetric, no curvature, ROM normal, no CVA tenderness   Lungs:   Clear to auscultation bilaterally, respirations unlabored   Heart:  Regular rate and rhythm, S1 and S2 normal, no murmur, rub, or gallop   Abdomen:   Soft, non-tender, bowel sounds active all four quadrants,  no masses, no organomegaly   Pelvic: Deferred   Extremities: Extremities normal, atraumatic, no cyanosis or edema   Pulses: 2+ and symmetric   Skin: Skin color, texture, turgor normal, no rashes or lesions   Lymph nodes: Cervical, supraclavicular, and axillary nodes normal   Neurologic: Normal   Skin: no rash. Has a bit of a dry spot on her back but her whole back is itchy. No rash on back of scalp.     /78 (BP Location: Right arm, Patient Position: Sitting, Cuff Size: adult)   Pulse 64   Temp 97.4 °F (36.3 °C) (Temporal)   Resp 16   Ht 5' 0.95\" (1.548 m)   Wt 164 lb 6.4 oz (74.6 kg)   LMP 10/14/2010   Breastfeeding No   BMI 31.11 kg/m²  Estimated body mass index is 31.11 kg/m² as calculated from the following:    Height as of this encounter: 5' 0.95\" (1.548 m).    Weight as of this encounter: 164 lb 6.4 oz (74.6 kg).    Medicare Hearing Assessment:   Hearing Screening    Screening Method: Finger Rub  Finger Rub Result: Pass               Assessment & Plan:   Tessa Rosario is a 69 year old female who presents for a Medicare Assessment.     1. Encounter for annual health examination  mammo due 11/2025. Ordered.   Declines pelvic exam today. Breast exam done.   cologuard done 11/2023 was negative.   dexa done 2021 was normal. Ordered.      Up to date tdap, rsv.   Up to date prevnar, pneumovax.   She will get shingrix, covid booster (last 9/2024) at her  local pharmacy.      [facility] flowsheet reviewed.   No falls.   Memory testing normal.   Mood has been stable. No depression.   Seeing eye doctor yearly.      2. Essential hypertension  Continue meds.   - Basic Metabolic Panel (8) [E]; Future    3. Bilateral carotid artery disease, unspecified type  Continue diet and lifestyle modifications.  Statin contraindicated due to liver issues.     4. Coronary artery disease involving native coronary artery of native heart without angina pectoris  Stable. Continue current management.    - [facility] CARDIOLOGY EXTERNAL    5. Lung nodule  Benign on report.     6. Class 1 obesity due to excess calories with serious comorbidity and body mass index (BMI) of 32.0 to 32.9 in adult  She has worked hard on diet and exercise. Working on losing weight.     7. Impaired fasting glucose  A1c now normal.     8. Fatty liver  follow up with hepatology.   - [facility]at[facility] - [facility]    9. Contraindication to statin medication  Cannot take statin due to her MASH    10. Renal mass  Seen on us abdomen done by hepatology.   Will check ct.   - CT ABDOMEN(CONTRAST ONLY) (CPT=74160); Future    11. Encounter for screening mammogram for malignant neoplasm of breast  - [facility] ANA CRISTINA 2D+3D SCREENING BILAT (CPT=77067/64266); Future    12. Postmenopausal  - XR DEXA BONE DENSITOMETRY (CPT=77080); Future    13. Itching  - DERM - INTERNAL    14. Dermatitis  - DERM - INTERNAL      The patient indicates understanding of these issues and agrees to the plan.  Reinforced healthy diet, lifestyle, and exercise.      Return in about 6 months (around 10/28/2025) for med check.     Kathryn Goodman MD, 4/27/2025     Supplementary Documentation:   General Health:  In the past six months, have you lost more than 10 pounds without trying?: (Patient-Rptd) 3 - Don't know  Has your appetite been poor?: (Patient-Rptd) No  Type of Diet: (Patient-Rptd) Low Salt  How does the patient maintain a good energy level?: (Patient-Rptd) Daily  Walks  How would you describe your daily physical activity?: (Patient-Rptd) Moderate  How would you describe your current health state?: (Patient-Rptd) Good  How do you maintain positive mental well-being?: (Patient-Rptd) Social Interaction, Visiting Friends, Visiting Family  On a scale of 0 to 10, with 0 being no pain and 10 being severe pain, what is your pain level?: 2 - (Mild) (RUQ pain)  In the past six months, have you experienced urine leakage?: (Patient-Rptd) 0-No  At any time do you feel concerned for the safety/well-being of yourself and/or your children, in your home or elsewhere?: (Patient-Rptd) No  Have you had any immunizations at another office such as Influenza, Hepatitis B, Tetanus, or Pneumococcal?: (Patient-Rptd) Yes    Health Maintenance   Topic Date Due    Zoster Vaccines (2 of 3) 11/28/2015    Pap Smear  10/23/2023    Annual Well Visit  01/01/2025    Annual Depression Screening  01/01/2025    COVID-19 Vaccine (9 - 2024-25 season) 03/14/2025    Mammogram  11/04/2025    Colorectal Cancer Screening  12/01/2026    Influenza Vaccine  Completed    DEXA Scan  Completed    Fall Risk Screening (Annual)  Completed    Pneumococcal Vaccine: 50+ Years  Completed    Meningococcal B Vaccine  Aged Out            [1]   Patient Active Problem List  Diagnosis    Essential hypertension    Class 1 obesity due to excess calories with serious comorbidity and body mass index (BMI) of 32.0 to 32.9 in adult    Lung nodule    Fatty liver    Bilateral carotid artery disease    Coronary artery disease involving native coronary artery of native heart without angina pectoris    Impaired fasting glucose   [2]   Outpatient Medications Marked as Taking for the 4/28/25 encounter (Office Visit) with Kathryn Goodman MD   Medication Sig    LISINOPRIL 40 MG Oral Tab TAKE 1 TABLET(40 MG) BY MOUTH DAILY    METOPROLOL SUCCINATE  MG Oral Tablet 24 Hr TAKE 1 TABLET(100 MG) BY MOUTH DAILY    Triamterene-HCTZ 37.5-25 MG Oral Tab TAKE 1  TABLET BY MOUTH DAILY    REZDIFFRA 80 MG Oral Tab Take 80 mg by mouth daily.    ASPIRIN 81 OR Take by mouth daily.    vitamin E 400 UNITS Oral Cap Take 1 capsule (400 Units total) by mouth daily. Take 2 daily (800mg)    Multiple Vitamins-Minerals (MULTIVITAMIN OR) Take 1 tablet by mouth daily.

## 2025-05-18 ENCOUNTER — HOSPITAL ENCOUNTER (OUTPATIENT)
Dept: CT IMAGING | Age: 70
Discharge: HOME | End: 2025-05-18
Attending: INTERNAL MEDICINE
Payer: MEDICARE

## 2025-05-18 ENCOUNTER — HOSPITAL ENCOUNTER (OUTPATIENT)
Dept: CT IMAGING | Age: 70
End: 2025-05-18
Attending: INTERNAL MEDICINE
Payer: MEDICARE

## 2025-05-18 DIAGNOSIS — N28.89 RENAL MASS: ICD-10-CM

## 2025-05-18 LAB
CREAT BLD-MCNC: 0.8 MG/DL (ref 0.55–1.02)
EGFRCR SERPLBLD CKD-EPI 2021: 79 ML/MIN/1.73M2 (ref 60–?)

## 2025-05-18 PROCEDURE — 74160 CT ABDOMEN W/CONTRAST: CPT | Performed by: INTERNAL MEDICINE

## 2025-05-18 PROCEDURE — 82565 ASSAY OF CREATININE: CPT

## 2025-05-29 ENCOUNTER — TELEPHONE (OUTPATIENT)
Dept: INTERNAL MEDICINE CLINIC | Facility: CLINIC | Age: 70
End: 2025-05-29

## 2025-05-29 NOTE — TELEPHONE ENCOUNTER
Incoming (mail or fax):  fax  Received from:  [facility]  Documentation given to:  Triage incoming    Statin therapy request

## 2025-05-30 DIAGNOSIS — I10 ESSENTIAL HYPERTENSION: ICD-10-CM

## 2025-05-30 NOTE — TELEPHONE ENCOUNTER
They will only except ciffhosis as a diagnosis to take her off the list. If this is appropriate they ask that you please document this in next office visit notes to prevent further outreacg. Otherwise, please disregard, thank you.

## 2025-06-02 RX ORDER — LISINOPRIL 40 MG/1
40 TABLET ORAL DAILY
Qty: 90 TABLET | Refills: 1 | Status: SHIPPED | OUTPATIENT
Start: 2025-06-02

## 2025-06-02 RX ORDER — METOPROLOL SUCCINATE 100 MG/1
100 TABLET, EXTENDED RELEASE ORAL DAILY
Qty: 90 TABLET | Refills: 1 | Status: SHIPPED | OUTPATIENT
Start: 2025-06-02

## 2025-06-02 RX ORDER — TRIAMTERENE AND HYDROCHLOROTHIAZIDE 37.5; 25 MG/1; MG/1
1 TABLET ORAL DAILY
Qty: 90 TABLET | Refills: 1 | Status: SHIPPED | OUTPATIENT
Start: 2025-06-02

## 2025-06-02 NOTE — TELEPHONE ENCOUNTER
Hypertension Medications Protocol [id number]/30/2025 06:15 AM   Protocol Details CMP or BMP in past 12 months    Last BP reading less than 140/90    In person appointment or virtual visit in the past 12 mos or appointment in next 3 mos    EGFRCR or GFRNAA > 50    Medication is active on med list      2. Essential hypertension  Continue meds.   Future Appointments   Date Time Provider Department Center   6/3/2025  6:30 AM [facility] THRU LAB DNGLAB [facility]   6/7/2025  1:40 PM [facility] DEXA RM1 [facility] DEXA EM [place]   10/13/2025  6:30 AM RE[facility] [place] THRU ELMREFLABDG [facility]   10/28/2025  8:00 AM Kathryn Goodman MD [facility] [place]   11/8/2025  7:40 AM [facility] ABDULKADIR RM1 [facility] ABDULKADIR EM [place]

## 2025-06-03 ENCOUNTER — LAB ENCOUNTER (OUTPATIENT)
Dept: LAB | Age: 70
End: 2025-06-03
Attending: INTERNAL MEDICINE
Payer: MEDICARE

## 2025-06-03 DIAGNOSIS — D69.6 THROMBOCYTOPENIA: ICD-10-CM

## 2025-06-03 LAB
BASOPHILS # BLD AUTO: 0.03 X10(3) UL (ref 0–0.2)
BASOPHILS NFR BLD AUTO: 0.7 %
DEPRECATED RDW RBC AUTO: 39.6 FL (ref 35.1–46.3)
EOSINOPHIL # BLD AUTO: 0.16 X10(3) UL (ref 0–0.7)
EOSINOPHIL NFR BLD AUTO: 3.6 %
ERYTHROCYTE [DISTWIDTH] IN BLOOD BY AUTOMATED COUNT: 11.5 % (ref 11–15)
HCT VFR BLD AUTO: 42.8 % (ref 35–48)
HGB BLD-MCNC: 15.1 G/DL (ref 12–16)
IMM GRANULOCYTES # BLD AUTO: 0.01 X10(3) UL (ref 0–1)
IMM GRANULOCYTES NFR BLD: 0.2 %
LYMPHOCYTES # BLD AUTO: 0.87 X10(3) UL (ref 1–4)
LYMPHOCYTES NFR BLD AUTO: 19.8 %
MCH RBC QN AUTO: 33.2 PG (ref 26–34)
MCHC RBC AUTO-ENTMCNC: 35.3 G/DL (ref 31–37)
MCV RBC AUTO: 94.1 FL (ref 80–100)
MONOCYTES # BLD AUTO: 0.62 X10(3) UL (ref 0.1–1)
MONOCYTES NFR BLD AUTO: 14.1 %
NEUTROPHILS # BLD AUTO: 2.7 X10 (3) UL (ref 1.5–7.7)
NEUTROPHILS # BLD AUTO: 2.7 X10(3) UL (ref 1.5–7.7)
NEUTROPHILS NFR BLD AUTO: 61.6 %
PLATELET # BLD AUTO: 144 10(3)UL (ref 150–450)
RBC # BLD AUTO: 4.55 X10(6)UL (ref 3.8–5.3)
WBC # BLD AUTO: 4.4 X10(3) UL (ref 4–11)

## 2025-06-03 PROCEDURE — 85025 COMPLETE CBC W/AUTO DIFF WBC: CPT

## 2025-06-03 PROCEDURE — 36415 COLL VENOUS BLD VENIPUNCTURE: CPT

## 2025-06-07 ENCOUNTER — HOSPITAL ENCOUNTER (OUTPATIENT)
Dept: BONE DENSITY | Age: 70
Discharge: HOME | End: 2025-06-07
Attending: INTERNAL MEDICINE
Payer: MEDICARE

## 2025-06-07 DIAGNOSIS — Z78.0 POSTMENOPAUSAL: ICD-10-CM

## 2025-06-07 PROCEDURE — 77080 DXA BONE DENSITY AXIAL: CPT | Performed by: INTERNAL MEDICINE

## 2025-06-19 ENCOUNTER — MED REC SCAN ONLY (OUTPATIENT)
Dept: INTERNAL MEDICINE CLINIC | Facility: CLINIC | Age: 70
End: 2025-06-19

## 2025-07-24 ENCOUNTER — E-VISIT (OUTPATIENT)
Dept: TELEHEALTH | Age: 70
End: 2025-07-24
Payer: MEDICARE

## 2025-07-24 DIAGNOSIS — L53.9 LOCALIZED ERYTHEMA: Primary | ICD-10-CM

## 2025-07-24 RX ORDER — TRIAMCINOLONE ACETONIDE 1 MG/G
CREAM TOPICAL
COMMUNITY
Start: 2025-05-13

## 2025-07-24 RX ORDER — CICLOPIROX 1 G/100ML
SHAMPOO TOPICAL
COMMUNITY
Start: 2025-06-26

## 2025-07-24 RX ORDER — CLOBETASOL PROPIONATE 0.5 MG/ML
SOLUTION TOPICAL
COMMUNITY
Start: 2025-06-26

## 2025-07-24 NOTE — PROGRESS NOTES
Tessa Rosario is a 69 year old female who initiated e-visit care today.    HPI:   See answers to questionnaire submission     Current Medications[1]   Past Medical History[2]   Past Surgical History[3]   Family History[4]   Social History:  Short Social Hx on File[5]          ASSESSMENT AND PLAN:       Diagnoses and all orders for this visit:    Localized erythema    Sx present for only a few hours.  Slightly improving.  Patient suspects bug bite.  No N/T. No blistering.  Currently not itchy but applying Benadryl cream.  Advised to stop Benadryl cream, change to OTC hydrocortisone QID, cool compresses, oral antihistamine.  Advised close follow up for worsening sx.  Differential is vast-- insect bite/sting, herpes, infectious/erysipelas       Duration of  the service:  12 minutes      See Zapstitch message exchange and Patient Instructions for Comfort Care and patient education.          [1]   Current Outpatient Medications   Medication Sig Dispense Refill    Ciclopirox 1 % External Shampoo       clobetasol 0.05 % External Solution       triamcinolone 0.1 % External Cream       metoprolol succinate  MG Oral Tablet 24 Hr TAKE 1 TABLET(100 MG) BY MOUTH DAILY 90 tablet 1    TRIAMTERENE-HCTZ 37.5-25 MG Oral Tab TAKE 1 TABLET BY MOUTH DAILY 90 tablet 1    lisinopril 40 MG Oral Tab TAKE 1 TABLET(40 MG) BY MOUTH DAILY 90 tablet 1    REZDIFFRA 80 MG Oral Tab Take 80 mg by mouth daily.      ASPIRIN 81 OR Take by mouth daily.      vitamin E 400 UNITS Oral Cap Take 1 capsule (400 Units total) by mouth daily. Take 2 daily (800mg)      Multiple Vitamins-Minerals (MULTIVITAMIN OR) Take 1 tablet by mouth daily.       [2]   Past Medical History:   Abnormal mammogram    scattered fibroglandular densities    Abnormal mammogram, unspecified    Atherosclerosis of coronary artery    BPPV (benign paroxysmal positional vertigo)    Cholecystitis with cholelithiasis    COVID    Disorder of liver    elevated liver functions     Dysuria    Elevated alkaline phosphatase level    High blood pressure    HTN (hypertension)    Lung nodule    Obesity    Strabismus    Unspecified essential hypertension    Visual impairment    prescription glasses    Vitamin D deficiency    Vulvovaginitis   [3]   Past Surgical History:  Procedure Laterality Date    Cataract      Not significant as of now    Cholecystectomy      Hernia surgery      repair double hernia    Kaylen biopsy stereo nodule 1 site right (cpt=19081)  2014      1982    Other surgical history  2018    liver biopsy.  Fatty liver   [4]   Family History  Problem Relation Age of Onset    Other (Other) Father         ETOH, kidney failure    Obesity Father          Renal failure    Hypertension Mother          Stroke    Other (stroke) Mother     Other (htn) Mother     Stroke Mother             Diabetes Maternal Grandmother             Other (diabetes) Maternal Grandmother 40    Obesity Sister          chronic obstructive pulmonary disease/stroke heart failure gallbladder   [5]   Social History  Socioeconomic History    Marital status:    Occupational History    Occupation: office work   Tobacco Use    Smoking status: Never     Passive exposure: Never    Smokeless tobacco: Never   Vaping Use    Vaping status: Never Used   Substance and Sexual Activity    Alcohol use: No    Drug use: Never   Other Topics Concern    Caffeine Concern Yes     Comment: 1 cup per day    Stress Concern No    Weight Concern No    Special Diet No    Exercise Yes     Comment: limited to walking /stair climbing    Seat Belt Yes     Social Drivers of Health     Food Insecurity: No Food Insecurity (2025)    NCSS - Food Insecurity     Worried About Running Out of Food in the Last Year: No     Ran Out of Food in the Last Year: No   Transportation Needs: No Transportation Needs (2025)    NCSS - Transportation     Lack of Transportation: No   Housing Stability:  Not At Risk (4/28/2025)    NCSS - Housing/Utilities     Has Housing: Yes     Worried About Losing Housing: No     Unable to Get Utilities: No

## (undated) DIAGNOSIS — R74.8 ELEVATED LIVER ENZYMES: ICD-10-CM

## (undated) DIAGNOSIS — K75.81 NASH (NONALCOHOLIC STEATOHEPATITIS): ICD-10-CM

## (undated) DIAGNOSIS — K76.0 FATTY LIVER: Primary | ICD-10-CM

## (undated) DIAGNOSIS — I10 ESSENTIAL HYPERTENSION: ICD-10-CM

## (undated) DIAGNOSIS — K74.00 LIVER FIBROSIS: ICD-10-CM

## (undated) NOTE — MR AVS SNAPSHOT
[address] [address] [phone]               Thank you for choosing us for your health care visit with Corrine Allen MD.  We are glad to serve you and happy to provide you with These medications were sent to [address] [place] [address], [facility], [address] [facility], [address]     Phone:  [phone]    - atenolol 50 MG Tabs  - lisinopril 20 MG Tabs            MyChart     Visit M Get your heart pumping – brisk walking, biking, swimming Even 10 minute increments are effective and add up over the week   2 ½ hours per week – spread out over time Use a garo to keep you motivated   Don’t forget strength training with weights and resist

## (undated) NOTE — LETTER
12/04/19        [facility] [address] [address]      Dear Waqar Mackenzie,    [facility] records indicate that you have outstanding lab work and or testing that was ordered for you and has not yet been completed:        Hemoglobin

## (undated) NOTE — LETTER
02/03/22      [facility] [address] [address]    Dear Kiel Ramos,    [facility] records indicate that you have outstanding lab work. To schedule an appointment please call Central Scheduling at [phone].  You may also schedu

## (undated) NOTE — MR AVS SNAPSHOT
[address] [id number] [phone]               Thank you for choosing us for your health care visit with Dearlilly Mulligan MD.  We are glad to serve you and happy to provide you with - atenolol 50 MG Tabs  - lisinopril 20 MG Tabs            MyChart     Visit [facility]  You can access your MyChart to more actively manage your health care and view more details from this visit by going to [email or web address].   If you've recently h